# Patient Record
Sex: FEMALE | Race: WHITE | NOT HISPANIC OR LATINO | Employment: UNEMPLOYED | URBAN - NONMETROPOLITAN AREA
[De-identification: names, ages, dates, MRNs, and addresses within clinical notes are randomized per-mention and may not be internally consistent; named-entity substitution may affect disease eponyms.]

---

## 2017-01-23 ENCOUNTER — OFFICE VISIT (OUTPATIENT)
Dept: SLEEP MEDICINE | Facility: HOSPITAL | Age: 42
End: 2017-01-23
Attending: INTERNAL MEDICINE

## 2017-01-23 ENCOUNTER — OUTSIDE FACILITY SERVICE (OUTPATIENT)
Dept: PULMONOLOGY | Facility: CLINIC | Age: 42
End: 2017-01-23

## 2017-01-23 DIAGNOSIS — G47.19 EXCESSIVE DAYTIME SLEEPINESS: ICD-10-CM

## 2017-01-23 DIAGNOSIS — R06.83 SNORING: ICD-10-CM

## 2017-01-23 PROCEDURE — S0260 H&P FOR SURGERY: HCPCS | Performed by: INTERNAL MEDICINE

## 2017-01-23 PROCEDURE — 95810 POLYSOM 6/> YRS 4/> PARAM: CPT

## 2017-02-21 RX ORDER — LEVOTHYROXINE SODIUM 0.2 MG/1
TABLET ORAL
Qty: 30 TABLET | Refills: 5 | Status: SHIPPED | OUTPATIENT
Start: 2017-02-21 | End: 2017-10-05 | Stop reason: SDUPTHER

## 2017-02-24 ENCOUNTER — OFFICE VISIT (OUTPATIENT)
Dept: INTERNAL MEDICINE | Facility: CLINIC | Age: 42
End: 2017-02-24

## 2017-02-24 VITALS
DIASTOLIC BLOOD PRESSURE: 80 MMHG | HEIGHT: 64 IN | HEART RATE: 71 BPM | BODY MASS INDEX: 40.63 KG/M2 | OXYGEN SATURATION: 98 % | WEIGHT: 238 LBS | SYSTOLIC BLOOD PRESSURE: 130 MMHG

## 2017-02-24 DIAGNOSIS — R00.0 TACHYCARDIA: ICD-10-CM

## 2017-02-24 DIAGNOSIS — E07.9 THYROID DISORDER: ICD-10-CM

## 2017-02-24 DIAGNOSIS — E55.9 VITAMIN D DEFICIENCY: Primary | ICD-10-CM

## 2017-02-24 DIAGNOSIS — Z79.899 ENCOUNTER FOR LONG-TERM CURRENT USE OF MEDICATION: ICD-10-CM

## 2017-02-24 LAB
25(OH)D3 SERPL-MCNC: 13.9 NG/ML
T4 FREE SERPL-MCNC: 1.18 NG/DL (ref 0.89–1.76)
TSH SERPL DL<=0.05 MIU/L-ACNC: 0.02 MIU/ML (ref 0.35–5.35)

## 2017-02-24 PROCEDURE — 99214 OFFICE O/P EST MOD 30 MIN: CPT | Performed by: FAMILY MEDICINE

## 2017-02-24 PROCEDURE — 82306 VITAMIN D 25 HYDROXY: CPT | Performed by: FAMILY MEDICINE

## 2017-02-24 PROCEDURE — 84439 ASSAY OF FREE THYROXINE: CPT | Performed by: FAMILY MEDICINE

## 2017-02-24 PROCEDURE — 84443 ASSAY THYROID STIM HORMONE: CPT | Performed by: FAMILY MEDICINE

## 2017-02-24 PROCEDURE — 36415 COLL VENOUS BLD VENIPUNCTURE: CPT | Performed by: FAMILY MEDICINE

## 2017-02-24 RX ORDER — CARVEDILOL PHOSPHATE 20 MG/1
20 CAPSULE, EXTENDED RELEASE ORAL DAILY
Qty: 30 CAPSULE | Refills: 11 | Status: SHIPPED | OUTPATIENT
Start: 2017-02-24 | End: 2017-03-31 | Stop reason: ALTCHOICE

## 2017-02-24 NOTE — PROGRESS NOTES
Subjective   Debi Munoz is a 41 y.o. female.     History of Present Illness   Debi presents today for fu on vitamin D def, as well as hypothyroidism. She's needing bi-annual labs for monitoring.  VSS. NAD.  She's stable on the cymbalta for her depression and seems well with it.  She states that the bystolic has a 50 dollar copay, and that's a lot for them. She is taking it however. We can try coreg and see how that does.      The following portions of the patient's history were reviewed and updated as appropriate: allergies, current medications, past social history and problem list.    Review of Systems   Constitutional: Negative for appetite change, chills, fatigue, fever and unexpected weight change.   HENT: Negative for congestion, ear pain, hearing loss, nosebleeds, postnasal drip, rhinorrhea, sore throat, tinnitus and trouble swallowing.    Eyes: Negative for photophobia, discharge and visual disturbance.   Respiratory: Negative for cough, chest tightness, shortness of breath and wheezing.    Cardiovascular: Negative for chest pain, palpitations and leg swelling.   Gastrointestinal: Negative for abdominal distention, abdominal pain, blood in stool, constipation, diarrhea, nausea and vomiting.   Endocrine: Negative for cold intolerance, heat intolerance, polydipsia, polyphagia and polyuria.   Musculoskeletal: Negative for arthralgias, back pain, joint swelling, myalgias, neck pain and neck stiffness.   Skin: Negative for color change, pallor, rash and wound.   Allergic/Immunologic: Negative for environmental allergies, food allergies and immunocompromised state.   Neurological: Negative for dizziness, tremors, seizures, weakness, numbness and headaches.   Hematological: Negative for adenopathy. Does not bruise/bleed easily.   Psychiatric/Behavioral: Negative for agitation, behavioral problems, confusion, hallucinations, self-injury and suicidal ideas. The patient is not nervous/anxious.        Objective  "  Visit Vitals   • /80   • Pulse 71   • Ht 64\" (162.6 cm)   • Wt 238 lb (108 kg)   • SpO2 98%   • BMI 40.85 kg/m2     Physical Exam   Constitutional: She is oriented to person, place, and time. She appears well-developed and well-nourished. No distress.   HENT:   Head: Normocephalic and atraumatic.   Right Ear: External ear normal.   Left Ear: External ear normal.   Nose: Nose normal.   Mouth/Throat: Oropharynx is clear and moist.   Eyes: Conjunctivae and EOM are normal. Pupils are equal, round, and reactive to light. Right eye exhibits no discharge. Left eye exhibits no discharge. No scleral icterus.   Neck: Normal range of motion. Neck supple. No JVD present. No tracheal deviation present. No thyromegaly present.   Cardiovascular: Normal rate, regular rhythm, normal heart sounds and intact distal pulses.  Exam reveals no gallop and no friction rub.    No murmur heard.  Pulmonary/Chest: Effort normal and breath sounds normal. No stridor. No respiratory distress. She has no wheezes. She has no rales. She exhibits no tenderness.   Abdominal: Soft. Bowel sounds are normal. She exhibits no distension and no mass. There is no tenderness. There is no rebound and no guarding. No hernia.   Musculoskeletal: Normal range of motion. She exhibits no edema, tenderness or deformity.   Lymphadenopathy:     She has no cervical adenopathy.   Neurological: She is alert and oriented to person, place, and time. She has normal reflexes. She displays normal reflexes. No cranial nerve deficit. She exhibits normal muscle tone.   Skin: Skin is warm and dry. No rash noted. No erythema. No pallor.   Psychiatric: She has a normal mood and affect. Her behavior is normal. Judgment normal.       Assessment/Plan   Problem List Items Addressed This Visit        Cardiovascular and Mediastinum    Tachycardia    Relevant Medications    carvedilol CR (COREG CR) 20 MG 24 hr capsule       Digestive    Vitamin D deficiency - Primary    Relevant " Orders    T4, Free    TSH    Vitamin D 25 Hydroxy       Endocrine    Thyroid disorder    Relevant Medications    carvedilol CR (COREG CR) 20 MG 24 hr capsule    Other Relevant Orders    T4, Free    TSH    Vitamin D 25 Hydroxy       Other    Encounter for long-term current use of medication    Relevant Orders    T4, Free    TSH    Vitamin D 25 Hydroxy        Get labs, try coreg vs bystolic, and fu in a month. Sooner if needed.

## 2017-03-29 ENCOUNTER — TELEPHONE (OUTPATIENT)
Dept: PULMONOLOGY | Facility: CLINIC | Age: 42
End: 2017-03-29

## 2017-03-29 DIAGNOSIS — G47.33 OSA (OBSTRUCTIVE SLEEP APNEA): Primary | ICD-10-CM

## 2017-03-29 NOTE — TELEPHONE ENCOUNTER
Patient, spouse called states patient has not been called with report from sleep study done 01/2017. WINNIE Castillo was given report she will send report to [facility].

## 2017-03-31 ENCOUNTER — OFFICE VISIT (OUTPATIENT)
Dept: INTERNAL MEDICINE | Facility: CLINIC | Age: 42
End: 2017-03-31

## 2017-03-31 VITALS
WEIGHT: 241 LBS | OXYGEN SATURATION: 94 % | BODY MASS INDEX: 41.15 KG/M2 | HEART RATE: 74 BPM | SYSTOLIC BLOOD PRESSURE: 120 MMHG | HEIGHT: 64 IN | DIASTOLIC BLOOD PRESSURE: 80 MMHG

## 2017-03-31 DIAGNOSIS — R00.0 TACHYCARDIA: Primary | ICD-10-CM

## 2017-03-31 DIAGNOSIS — E07.9 THYROID DISORDER: ICD-10-CM

## 2017-03-31 DIAGNOSIS — F41.9 ANXIETY AND DEPRESSION: ICD-10-CM

## 2017-03-31 DIAGNOSIS — E55.9 VITAMIN D DEFICIENCY: ICD-10-CM

## 2017-03-31 DIAGNOSIS — F32.A ANXIETY AND DEPRESSION: ICD-10-CM

## 2017-03-31 DIAGNOSIS — Z79.899 ENCOUNTER FOR LONG-TERM CURRENT USE OF MEDICATION: ICD-10-CM

## 2017-03-31 PROCEDURE — 99214 OFFICE O/P EST MOD 30 MIN: CPT | Performed by: FAMILY MEDICINE

## 2017-03-31 RX ORDER — ERGOCALCIFEROL 1.25 MG/1
50000 CAPSULE ORAL WEEKLY
Qty: 12 CAPSULE | Refills: 3 | Status: SHIPPED | OUTPATIENT
Start: 2017-03-31 | End: 2020-10-12

## 2017-03-31 RX ORDER — NEBIVOLOL HYDROCHLORIDE 10 MG/1
TABLET ORAL
Refills: 11 | COMMUNITY
Start: 2017-03-23 | End: 2017-12-21 | Stop reason: SDUPTHER

## 2017-03-31 NOTE — PROGRESS NOTES
"Subjective   Debikelby Munoz is a 41 y.o. female.     History of Present Illness   Debi had vitamin D that was low at 17, down from 23 prefiously.  She is fine with restarting the vitamin D supplementations.  She states that the bystolic and the coreg XR were both 50 dollars copay, so she's taking the bystolic.  She rarely takes the lorazepam.  She is otherwise doing well.      The following portions of the patient's history were reviewed and updated as appropriate: allergies, current medications, past social history and problem list.    Review of Systems   Constitutional: Negative for appetite change, chills, fatigue, fever and unexpected weight change.   HENT: Negative for congestion, ear pain, hearing loss, nosebleeds, postnasal drip, rhinorrhea, sore throat, tinnitus and trouble swallowing.    Eyes: Negative for photophobia, discharge and visual disturbance.   Respiratory: Negative for cough, chest tightness, shortness of breath and wheezing.    Cardiovascular: Negative for chest pain, palpitations and leg swelling.   Gastrointestinal: Negative for abdominal distention, abdominal pain, blood in stool, constipation, diarrhea, nausea and vomiting.   Endocrine: Negative for cold intolerance, heat intolerance, polydipsia, polyphagia and polyuria.   Musculoskeletal: Negative for arthralgias, back pain, joint swelling, myalgias, neck pain and neck stiffness.   Skin: Negative for color change, pallor, rash and wound.   Allergic/Immunologic: Negative for environmental allergies, food allergies and immunocompromised state.   Neurological: Negative for dizziness, tremors, seizures, weakness, numbness and headaches.   Hematological: Negative for adenopathy. Does not bruise/bleed easily.   Psychiatric/Behavioral: Negative for agitation, behavioral problems, confusion, hallucinations, self-injury and suicidal ideas. The patient is not nervous/anxious.        Objective   /80  Pulse 74  Ht 64\" (162.6 cm)  Wt 241 lb " (109 kg)  SpO2 94%  BMI 41.37 kg/m2  Physical Exam   Constitutional: She is oriented to person, place, and time. She appears well-developed and well-nourished. No distress.   HENT:   Head: Normocephalic and atraumatic.   Right Ear: External ear normal.   Left Ear: External ear normal.   Nose: Nose normal.   Mouth/Throat: Oropharynx is clear and moist.   Eyes: Conjunctivae and EOM are normal. Pupils are equal, round, and reactive to light. Right eye exhibits no discharge. Left eye exhibits no discharge. No scleral icterus.   Neck: Normal range of motion. Neck supple. No JVD present. No tracheal deviation present. No thyromegaly present.   Cardiovascular: Normal rate, regular rhythm, normal heart sounds and intact distal pulses.  Exam reveals no gallop and no friction rub.    No murmur heard.  Pulmonary/Chest: Effort normal and breath sounds normal. No stridor. No respiratory distress. She has no wheezes. She has no rales. She exhibits no tenderness.   Abdominal: Soft. Bowel sounds are normal. She exhibits no distension and no mass. There is no tenderness. There is no rebound and no guarding. No hernia.   Musculoskeletal: Normal range of motion. She exhibits no edema, tenderness or deformity.   Lymphadenopathy:     She has no cervical adenopathy.   Neurological: She is alert and oriented to person, place, and time. She has normal reflexes. She displays normal reflexes. No cranial nerve deficit. She exhibits normal muscle tone.   Skin: Skin is warm and dry. No rash noted. No erythema. No pallor.   Psychiatric: She has a normal mood and affect. Her behavior is normal. Judgment normal.       Assessment/Plan   Problem List Items Addressed This Visit        Cardiovascular and Mediastinum    Tachycardia - Primary       Digestive    Vitamin D deficiency    Relevant Medications    vitamin D (ERGOCALCIFEROL) 51169 UNITS capsule capsule       Endocrine    Thyroid disorder    Relevant Medications    BYSTOLIC 10 MG tablet        Other    Anxiety and depression    Encounter for long-term current use of medication        Fu in august. Sooner if needed.

## 2017-06-19 ENCOUNTER — OFFICE VISIT (OUTPATIENT)
Dept: PULMONOLOGY | Facility: CLINIC | Age: 42
End: 2017-06-19

## 2017-06-19 VITALS
BODY MASS INDEX: 40.46 KG/M2 | WEIGHT: 237 LBS | DIASTOLIC BLOOD PRESSURE: 82 MMHG | OXYGEN SATURATION: 97 % | HEIGHT: 64 IN | HEART RATE: 82 BPM | RESPIRATION RATE: 16 BRPM | SYSTOLIC BLOOD PRESSURE: 110 MMHG

## 2017-06-19 DIAGNOSIS — E66.9 OBESITY (BMI 30-39.9): ICD-10-CM

## 2017-06-19 DIAGNOSIS — G47.00 INSOMNIA, UNSPECIFIED TYPE: ICD-10-CM

## 2017-06-19 DIAGNOSIS — G47.33 OSA (OBSTRUCTIVE SLEEP APNEA): Primary | ICD-10-CM

## 2017-06-19 PROCEDURE — 99214 OFFICE O/P EST MOD 30 MIN: CPT | Performed by: INTERNAL MEDICINE

## 2017-06-19 RX ORDER — TEMAZEPAM 15 MG/1
15 CAPSULE ORAL NIGHTLY PRN
Refills: 0
Start: 2017-06-19 | End: 2017-08-31

## 2017-06-19 NOTE — PROGRESS NOTES
"Chief Complaint   Patient presents with   • Follow-up   • Sleep Apnea         Subjective   Debi Munoz is a 42 y.o. female.     History of Present Illness   Patient comes back today to discuss the results of Sleep study.     I discussed the results of sleep study with the patient, in detail. I have told the patient that the apnea hypopnea index was 23.6 / hr.     Patient does report some improvement in daytime sleepiness, although she continues to struggle with her inability to maintain sleep throughout the night.  She says that she wakes up every 2 hours and usually sits in the bed for another 45 minutes or so before falling back asleep.      She says that she wakes up in the morning and usually takes a nap which lasts 2 hours, between 10 AM to 12 noon.      She denies any significant issues with the mask and actually seems to like her nasal mask.  She says that apart from minimal improvement in daytime sleepiness, she has noticed somewhat increased energy levels in the morning for the first 2 hours or so after she uses a CPAP device.  She does insist that most of her issues surrounding the fact that she cannot stay asleep for more than 2 hours at a time.    She usually goes to bed around 12:30 AM or 1 AM in the morning and wakes up in the morning at 7 AM.  She also endorses 2 or 3 naps per day, lasting 1-2 hours each.      The following portions of the patient's history were reviewed and updated as appropriate: allergies, current medications, past family history, past medical history, past social history and past surgical history.    Review of Systems   HENT: Negative for congestion, sinus pressure, sneezing and sore throat.    Respiratory: Positive for shortness of breath. Negative for cough and chest tightness.    Cardiovascular: Positive for leg swelling. Negative for chest pain.       Objective   Visit Vitals   • /82   • Pulse 82   • Resp 16   • Ht 64\" (162.6 cm)   • Wt 237 lb (108 kg)   • SpO2 97% "   • BMI 40.68 kg/m2       Physical Exam   Constitutional: She is oriented to person, place, and time. She appears well-developed and well-nourished.   HENT:   Head: Atraumatic.   Crowded oropharynx.    Cardiovascular: Normal rate and regular rhythm.    Pulmonary/Chest: Effort normal and breath sounds normal. She has no wheezes. She has no rales.   Musculoskeletal:   Gait was normal.   Neurological: She is alert and oriented to person, place, and time.   Psychiatric: She has a normal mood and affect.   Vitals reviewed.      Assessment/Plan   Debi was seen today for follow-up and sleep apnea.    Diagnoses and all orders for this visit:    ERNA (obstructive sleep apnea)    Insomnia, unspecified type    Obesity (BMI 30-39.9)    Other orders  -     temazepam (RESTORIL) 15 MG capsule; Take 1 capsule by mouth At Night As Needed for Sleep.         Return in about 4 months (around 10/19/2017) for Recheck, For Mackenzie.    DISCUSSION (if any):  Had a very long discussion with the patient with regards to sleep hygiene.  I spent 25-30 minutes detailing the multiple recommendations including sleep restriction and avoidance of naps during the day.  I also encouraged her to follow strict times to go to bed and wake up in the morning.    It is quite clear that the patient has responded to a degree, to her CPAP and currently her most bothersome issue seems to be insomnia.  She seems to have sleep maintenance insomnia.  I will start the patient on Restoril 15 mg.    She was advised to continue her current CPAP at a pressure of 8, with a nasal mask.    I reviewed sleep study in great detail with her and also shared the compliance data with her.  I told her that her AHI on the compliance it is only 4.4, although it was 23.6 on sleep study.    Arturo was reviewed.     Errors in dictation may reflect use of voice recognition software and not all errors in transcription may have been detected prior to signing    This document was  electronically signed by Afshan Smith MD June 19, 2017  12:24 PM

## 2017-08-31 ENCOUNTER — OFFICE VISIT (OUTPATIENT)
Dept: INTERNAL MEDICINE | Facility: CLINIC | Age: 42
End: 2017-08-31

## 2017-08-31 VITALS
BODY MASS INDEX: 41.48 KG/M2 | WEIGHT: 243 LBS | SYSTOLIC BLOOD PRESSURE: 130 MMHG | DIASTOLIC BLOOD PRESSURE: 80 MMHG | HEART RATE: 99 BPM | HEIGHT: 64 IN | OXYGEN SATURATION: 95 %

## 2017-08-31 DIAGNOSIS — Z00.00 HEALTH CARE MAINTENANCE: ICD-10-CM

## 2017-08-31 DIAGNOSIS — E55.9 VITAMIN D DEFICIENCY: ICD-10-CM

## 2017-08-31 DIAGNOSIS — E07.9 THYROID DISORDER: Primary | ICD-10-CM

## 2017-08-31 DIAGNOSIS — R20.0 NUMBNESS AND TINGLING IN RIGHT HAND: ICD-10-CM

## 2017-08-31 DIAGNOSIS — R20.2 NUMBNESS AND TINGLING IN RIGHT HAND: ICD-10-CM

## 2017-08-31 PROCEDURE — 99214 OFFICE O/P EST MOD 30 MIN: CPT | Performed by: FAMILY MEDICINE

## 2017-09-08 LAB
25(OH)D3+25(OH)D2 SERPL-MCNC: 45.1 NG/ML
BUN SERPL-MCNC: 13 MG/DL (ref 7–20)
BUN/CREAT SERPL: 18.6 (ref 7.1–23.5)
CALCIUM SERPL-MCNC: 9.3 MG/DL (ref 8.4–10.2)
CHLORIDE SERPL-SCNC: 109 MMOL/L (ref 98–107)
CHOLEST SERPL-MCNC: 168 MG/DL (ref 0–199)
CO2 SERPL-SCNC: 24 MMOL/L (ref 26–30)
CREAT SERPL-MCNC: 0.7 MG/DL (ref 0.6–1.3)
GLUCOSE SERPL-MCNC: 130 MG/DL (ref 74–98)
HDLC SERPL-MCNC: 43 MG/DL (ref 40–60)
LDLC SERPL CALC-MCNC: 86 MG/DL (ref 0–99)
POTASSIUM SERPL-SCNC: 4.5 MMOL/L (ref 3.5–5.1)
SODIUM SERPL-SCNC: 141 MMOL/L (ref 137–145)
T4 FREE SERPL-MCNC: 1.21 NG/DL (ref 0.78–2.19)
TRIGL SERPL-MCNC: 193 MG/DL
TSH SERPL DL<=0.005 MIU/L-ACNC: <0.015 MIU/ML (ref 0.47–4.68)
VLDLC SERPL CALC-MCNC: 38.6 MG/DL

## 2017-10-05 ENCOUNTER — OFFICE VISIT (OUTPATIENT)
Dept: INTERNAL MEDICINE | Facility: CLINIC | Age: 42
End: 2017-10-05

## 2017-10-05 VITALS
HEIGHT: 64 IN | DIASTOLIC BLOOD PRESSURE: 80 MMHG | HEART RATE: 68 BPM | WEIGHT: 241 LBS | SYSTOLIC BLOOD PRESSURE: 120 MMHG | BODY MASS INDEX: 41.15 KG/M2 | OXYGEN SATURATION: 94 %

## 2017-10-05 DIAGNOSIS — E07.9 THYROID DISORDER: ICD-10-CM

## 2017-10-05 DIAGNOSIS — Z23 NEED FOR VACCINATION: Primary | ICD-10-CM

## 2017-10-05 DIAGNOSIS — R73.03 PREDIABETES: ICD-10-CM

## 2017-10-05 DIAGNOSIS — S39.012A LUMBAR STRAIN, INITIAL ENCOUNTER: ICD-10-CM

## 2017-10-05 DIAGNOSIS — E55.9 VITAMIN D DEFICIENCY: ICD-10-CM

## 2017-10-05 LAB — HBA1C MFR BLD: 5.4 %

## 2017-10-05 PROCEDURE — 83036 HEMOGLOBIN GLYCOSYLATED A1C: CPT | Performed by: FAMILY MEDICINE

## 2017-10-05 PROCEDURE — 90686 IIV4 VACC NO PRSV 0.5 ML IM: CPT | Performed by: FAMILY MEDICINE

## 2017-10-05 PROCEDURE — 99213 OFFICE O/P EST LOW 20 MIN: CPT | Performed by: FAMILY MEDICINE

## 2017-10-05 PROCEDURE — 90471 IMMUNIZATION ADMIN: CPT | Performed by: FAMILY MEDICINE

## 2017-10-05 RX ORDER — IBUPROFEN 800 MG/1
800 TABLET ORAL EVERY 8 HOURS PRN
Qty: 90 TABLET | Refills: 11 | Status: SHIPPED | OUTPATIENT
Start: 2017-10-05 | End: 2020-12-22 | Stop reason: SDUPTHER

## 2017-10-05 RX ORDER — LEVOTHYROXINE SODIUM 0.2 MG/1
200 TABLET ORAL DAILY
Qty: 30 TABLET | Refills: 11 | Status: SHIPPED | OUTPATIENT
Start: 2017-10-05 | End: 2021-04-14

## 2017-10-05 NOTE — PROGRESS NOTES
"Subjective   Debi Munoz is a 42 y.o. female.     History of Present Illness   Debi has had elevated trig'ycerides for the past 2 years, and she had a fasting glucose of 130 not too long ago.  She has a daughter with type 1 diabetes.  She is stable on her thyroid levels.  VSS. NAD.  Her A1C today was 5.4.    The following portions of the patient's history were reviewed and updated as appropriate: allergies, current medications, past social history and problem list.    Review of Systems   Constitutional: Negative for appetite change, chills, fatigue, fever and unexpected weight change.   HENT: Negative for congestion, ear pain, hearing loss, nosebleeds, postnasal drip, rhinorrhea, sore throat, tinnitus and trouble swallowing.    Eyes: Negative for photophobia, discharge and visual disturbance.   Respiratory: Negative for cough, chest tightness, shortness of breath and wheezing.    Cardiovascular: Negative for chest pain, palpitations and leg swelling.   Gastrointestinal: Negative for abdominal distention, abdominal pain, blood in stool, constipation, diarrhea, nausea and vomiting.   Endocrine: Negative for cold intolerance, heat intolerance, polydipsia, polyphagia and polyuria.   Musculoskeletal: Negative for arthralgias, back pain, joint swelling, myalgias, neck pain and neck stiffness.   Skin: Negative for color change, pallor, rash and wound.   Allergic/Immunologic: Negative for environmental allergies, food allergies and immunocompromised state.   Neurological: Negative for dizziness, tremors, seizures, weakness, numbness and headaches.   Hematological: Negative for adenopathy. Does not bruise/bleed easily.   Psychiatric/Behavioral: Negative for agitation, behavioral problems, confusion, hallucinations, self-injury and suicidal ideas. The patient is not nervous/anxious.        Objective   /80  Pulse 68  Ht 64\" (162.6 cm)  Wt 241 lb (109 kg)  SpO2 94%  BMI 41.37 kg/m2  Physical Exam "   Constitutional: She is oriented to person, place, and time. She appears well-developed and well-nourished. No distress.   HENT:   Head: Normocephalic and atraumatic.   Right Ear: External ear normal.   Left Ear: External ear normal.   Nose: Nose normal.   Mouth/Throat: Oropharynx is clear and moist.   Eyes: Conjunctivae and EOM are normal. Pupils are equal, round, and reactive to light. Right eye exhibits no discharge. Left eye exhibits no discharge. No scleral icterus.   Neck: Normal range of motion. Neck supple. No JVD present. No tracheal deviation present. No thyromegaly present.   Cardiovascular: Normal rate, regular rhythm, normal heart sounds and intact distal pulses.  Exam reveals no gallop and no friction rub.    No murmur heard.  Pulmonary/Chest: Effort normal and breath sounds normal. No stridor. No respiratory distress. She has no wheezes. She has no rales. She exhibits no tenderness.   Abdominal: Soft. Bowel sounds are normal. She exhibits no distension and no mass. There is no tenderness. There is no rebound and no guarding. No hernia.   Musculoskeletal: Normal range of motion. She exhibits no edema, tenderness or deformity.   Lymphadenopathy:     She has no cervical adenopathy.   Neurological: She is alert and oriented to person, place, and time. She has normal reflexes. She displays normal reflexes. No cranial nerve deficit. She exhibits normal muscle tone.   Skin: Skin is warm and dry. No rash noted. No erythema. No pallor.   Psychiatric: She has a normal mood and affect. Her behavior is normal. Judgment normal.       Assessment/Plan   Problem List Items Addressed This Visit        Digestive    Vitamin D deficiency  No changes       Endocrine    Thyroid disorder  No changes       Other    Prediabetes    Relevant Orders    POC Glycosylated Hemoglobin (Hb A1C)    Need for vaccination - Primary    Relevant Orders    Flu Vaccine Quad PF 3YR+ (Completed)           FU in 6 months, sooner if needed.

## 2017-10-18 ENCOUNTER — PROCEDURE VISIT (OUTPATIENT)
Dept: ORTHOPEDIC SURGERY | Facility: CLINIC | Age: 42
End: 2017-10-18

## 2017-10-18 DIAGNOSIS — R20.2 NUMBNESS AND TINGLING IN RIGHT HAND: ICD-10-CM

## 2017-10-18 DIAGNOSIS — R20.0 NUMBNESS AND TINGLING IN RIGHT HAND: ICD-10-CM

## 2017-10-18 PROCEDURE — 95909 NRV CNDJ TST 5-6 STUDIES: CPT | Performed by: PHYSICAL THERAPIST

## 2017-10-18 PROCEDURE — 95886 MUSC TEST DONE W/N TEST COMP: CPT | Performed by: PHYSICAL THERAPIST

## 2017-10-19 ENCOUNTER — OFFICE VISIT (OUTPATIENT)
Dept: PULMONOLOGY | Facility: CLINIC | Age: 42
End: 2017-10-19

## 2017-10-19 VITALS
HEART RATE: 79 BPM | OXYGEN SATURATION: 98 % | RESPIRATION RATE: 17 BRPM | DIASTOLIC BLOOD PRESSURE: 90 MMHG | SYSTOLIC BLOOD PRESSURE: 122 MMHG | BODY MASS INDEX: 41.32 KG/M2 | HEIGHT: 64 IN | WEIGHT: 242 LBS

## 2017-10-19 DIAGNOSIS — E66.9 OBESITY (BMI 30-39.9): ICD-10-CM

## 2017-10-19 DIAGNOSIS — G47.33 OSA (OBSTRUCTIVE SLEEP APNEA): Primary | ICD-10-CM

## 2017-10-19 DIAGNOSIS — G47.00 INSOMNIA, UNSPECIFIED TYPE: ICD-10-CM

## 2017-10-19 PROCEDURE — 99214 OFFICE O/P EST MOD 30 MIN: CPT | Performed by: INTERNAL MEDICINE

## 2017-10-19 RX ORDER — DOXEPIN HYDROCHLORIDE 6 MG/1
6 TABLET ORAL
Qty: 30 TABLET | Refills: 3 | Status: SHIPPED | OUTPATIENT
Start: 2017-10-19 | End: 2020-08-26

## 2017-10-19 NOTE — PROGRESS NOTES
"Chief Complaint   Patient presents with   • Follow-up     ERNA         Subjective   Debi Munoz is a 42 y.o. female.     History of Present Illness   Comes in today to follow-up on obstructive sleep apnea and insomnia.    The patient says that she was able to start using her CPAP device on a regular basis and although she does feel slightly better, especially early on in the morning, she continues to have daytime sleepiness and tiredness.    The patient continues to have \"odd hours of sleep\".  She sometimes does not go to bed until 1 AM and even on a regular night, she does not go to bed to 11:30 PM or so.    The patient is using nasal mask with CPAP pressure at 8.    She says that the pressure does not bother her too much in the beginning but occasionally she feels that it is not \"enough\".    She continues to have nights where she sleeps for 8 hours, but mostly that occurs only once or twice a week.    The following portions of the patient's history were reviewed and updated as appropriate: allergies, current medications, past family history, past medical history, past social history and past surgical history.    Review of Systems   Constitutional: Negative for chills and fever.   HENT: Positive for sore throat. Negative for sinus pressure and sneezing.    Respiratory: Negative for cough, chest tightness and shortness of breath.    Psychiatric/Behavioral: Positive for sleep disturbance.       Objective   Visit Vitals   • /90   • Pulse 79   • Resp 17   • Ht 64\" (162.6 cm)   • Wt 242 lb (110 kg)   • SpO2 98%   • BMI 41.54 kg/m2       Physical Exam   Constitutional: She is oriented to person, place, and time. She appears well-developed and well-nourished.   HENT:   Head: Atraumatic.   Crowded oropharynx.    Cardiovascular: Normal rate and regular rhythm.    Pulmonary/Chest: Effort normal and breath sounds normal. No respiratory distress. She has no wheezes. She has no rales.   Musculoskeletal:   Gait was " normal.   Neurological: She is alert and oriented to person, place, and time.   Psychiatric: She has a normal mood and affect.   Vitals reviewed.      Assessment/Plan   Debi was seen today for follow-up.    Diagnoses and all orders for this visit:    ERNA (obstructive sleep apnea)  -     BIPAP / CPAP Adjustment    Insomnia, unspecified type    Obesity (BMI 30-39.9)    Other orders  -     Doxepin HCl 6 MG tablet; Take 6 mg by mouth every night at bedtime.         Return in about 8 weeks (around 12/14/2017) for Recheck, For Mackenzie.    DISCUSSION (if any):  The patient continues to have insomnia which could be multifactorial.    She clearly needs to improve on her sleep hygiene and once again discussed multiple strategies to achieve the same.    Since Ambien caused episodes of sleep eating/sleepwalking, I will avoid similar agents.    Restoril was unable to provide any significant relief.    I have recommended her to start a trial of doxepin at 6 mg dose to see if that relieves some of her symptoms of insomnia.    As far as his sleep apnea is concerned, I believe she will benefit from an auto Pap as some of her issues could be secondary to not adequate pressure on the CPAP device.  I will empirically start her on a pressure of 6/12.      Dictated utilizing Dragon dictation.    This document was electronically signed by Afshan Smith MD October 19, 2017  11:04 AM

## 2017-10-31 ENCOUNTER — TELEPHONE (OUTPATIENT)
Dept: INTERNAL MEDICINE | Facility: CLINIC | Age: 42
End: 2017-10-31

## 2017-10-31 DIAGNOSIS — G56.00 CARPAL TUNNEL SYNDROME, UNSPECIFIED LATERALITY: Primary | ICD-10-CM

## 2017-11-28 DIAGNOSIS — G56.00 CARPAL TUNNEL SYNDROME, UNSPECIFIED LATERALITY: Primary | ICD-10-CM

## 2017-12-14 ENCOUNTER — OFFICE VISIT (OUTPATIENT)
Dept: PULMONOLOGY | Facility: CLINIC | Age: 42
End: 2017-12-14

## 2017-12-14 VITALS
WEIGHT: 245 LBS | DIASTOLIC BLOOD PRESSURE: 80 MMHG | BODY MASS INDEX: 41.83 KG/M2 | HEIGHT: 64 IN | SYSTOLIC BLOOD PRESSURE: 122 MMHG | OXYGEN SATURATION: 97 % | RESPIRATION RATE: 16 BRPM | HEART RATE: 77 BPM

## 2017-12-14 DIAGNOSIS — G47.00 INSOMNIA, UNSPECIFIED TYPE: ICD-10-CM

## 2017-12-14 DIAGNOSIS — R06.02 SOB (SHORTNESS OF BREATH): ICD-10-CM

## 2017-12-14 DIAGNOSIS — G47.33 OSA (OBSTRUCTIVE SLEEP APNEA): Primary | ICD-10-CM

## 2017-12-14 DIAGNOSIS — R00.0 TACHYCARDIA: ICD-10-CM

## 2017-12-14 PROCEDURE — 99214 OFFICE O/P EST MOD 30 MIN: CPT | Performed by: NURSE PRACTITIONER

## 2017-12-14 NOTE — PROGRESS NOTES
"Chief Complaint   Patient presents with   • Follow-up   • Sleep Apnea         Subjective   Debi Munoz is a 42 y.o. female.     History of Present Illness   The patient comes in today for follow-up of obstructive sleep apnea and insomnia.    She reports having multiple reasons she does not sleep much including having a daughter with type 1 diabetes mellitus who is had several issues recently, getting up to take the dogs out in the middle of the night, and a  who snores very loudly.  She states the doxepin may help her sleep some but she has not been taking it but she did take the doxepin last night and today she feels \"like I can't wake up\".    She is using AutoPap at settings of 6/12 however she states she falls asleep sometimes without putting the machine on.  She will also get up in the middle of the night and moved to the couch and she does not always take her machine with her.  She does report feeling like her sleep is a little more refreshed when she uses the AutoPap at night.    She also has quite a bit of pain in her wrists and she is supposed to be having carpal tunnel surgery tomorrow.    The following portions of the patient's history were reviewed and updated as appropriate: allergies, current medications, past family history, past medical history, past social history and past surgical history.    Review of Systems   HENT: Positive for sore throat. Negative for sinus pressure and sneezing.    Respiratory: Negative for cough, shortness of breath and wheezing.        Objective   Visit Vitals   • /80   • Pulse 77   • Resp 16   • Ht 162.6 cm (64.02\")   • Wt 111 kg (245 lb)   • SpO2 97%   • BMI 42.03 kg/m2     Physical Exam   Constitutional: She is oriented to person, place, and time. She appears well-developed.   HENT:   Head: Atraumatic.   Crowded oropharynx.    Eyes: EOM are normal.   Neck:   Increased adipose tissue.   Pulmonary/Chest: Effort normal and breath sounds normal. She has no " wheezes.   Musculoskeletal:   Gait was normal.   Neurological: She is alert and oriented to person, place, and time.   Psychiatric: She has a normal mood and affect.   Vitals reviewed.          Assessment/Plan   Debi was seen today for follow-up and sleep apnea.    Diagnoses and all orders for this visit:    ERNA (obstructive sleep apnea)    Insomnia, unspecified type           Return in about 3 months (around 3/14/2018) for Recheck, For Dr. Smith.    DISCUSSION (if any):  Since she feels like the doxepin helps some but can leave her feeling tired or with a hangover feeling the next morning I have asked her to cut the dose in half and try taking the doxepin at half dose regular every night for 2 weeks.  Hopefully by decreasing the dose she will not have that hangover feeling the next morning or difficulty waking but will help her sleep during the night.    We have also discussed sleep hygiene in great detail.  I have spent 25-30 minutes reviewing sleep hygiene.  I have asked her to try to minimize distractions at night such as getting up with the dogs.  I've also asked her that if she moves to the couch in the middle of the night for whatever reason to take her machine with her.    She is not compliant with AutoPap use however she will continue to work on decreasing nighttime distractions as well as putting the mask and turning the machine on prior to falling asleep.  When she uses the machine her average AHI is 2.8.  She has also been sick several days and was unable to use the machine over the last month.  Since she is supposed to have carpal tunnel surgery tomorrow I have asked her to move the machine to wherever it is she will be spending most of her time sleeping whether that is in her bedroom or on the couch/recliner.    I also reviewed some of her previous labs and her TSH level was low in September.  The TSH was less than 0.015 and free T4 1 0.21.  She is currently on 200 µg of levothyroxine and states this  medication dose was not changed at that time.  I have asked her to continue to follow with her PCP and to discuss this with him should her level continued to be low as this can cause her to feel excessively fatigued as well.      Dictated utilizing Dragon dictation.    This document was electronically signed by JERO Alberto December 27, 2017  3:38 PM

## 2017-12-18 RX ORDER — NEBIVOLOL HYDROCHLORIDE 10 MG/1
TABLET ORAL
Qty: 30 TABLET | Refills: 0 | OUTPATIENT
Start: 2017-12-18

## 2017-12-21 NOTE — TELEPHONE ENCOUNTER
Patients spouse called the office requesting to see if we can call her Rx in. The patients spouse stated that she is planning on following [facility] when he starts up in January.

## 2017-12-22 RX ORDER — NEBIVOLOL HYDROCHLORIDE 10 MG/1
10 TABLET ORAL DAILY
Qty: 30 TABLET | Refills: 0 | Status: SHIPPED | OUTPATIENT
Start: 2017-12-22 | End: 2023-02-01

## 2018-03-20 ENCOUNTER — OFFICE VISIT (OUTPATIENT)
Dept: PULMONOLOGY | Facility: CLINIC | Age: 43
End: 2018-03-20

## 2018-03-20 VITALS
SYSTOLIC BLOOD PRESSURE: 126 MMHG | WEIGHT: 246 LBS | HEIGHT: 64 IN | OXYGEN SATURATION: 98 % | RESPIRATION RATE: 16 BRPM | BODY MASS INDEX: 42 KG/M2 | DIASTOLIC BLOOD PRESSURE: 72 MMHG | HEART RATE: 96 BPM

## 2018-03-20 DIAGNOSIS — G47.33 OSA (OBSTRUCTIVE SLEEP APNEA): Primary | ICD-10-CM

## 2018-03-20 DIAGNOSIS — E66.01 MORBID OBESITY, UNSPECIFIED OBESITY TYPE (HCC): ICD-10-CM

## 2018-03-20 DIAGNOSIS — Z72.821 POOR SLEEP HYGIENE: ICD-10-CM

## 2018-03-20 DIAGNOSIS — G47.00 INSOMNIA, UNSPECIFIED TYPE: ICD-10-CM

## 2018-03-20 PROCEDURE — 99214 OFFICE O/P EST MOD 30 MIN: CPT | Performed by: INTERNAL MEDICINE

## 2018-03-20 NOTE — PROGRESS NOTES
"Chief Complaint   Patient presents with   • Follow-up   • Sleep Apnea         Subjective   Debi Munoz is a 42 y.o. female.     History of Present Illness   Patient comes in today to follow-up on obstructive sleep apnea and insomnia.    She continues to struggle with very poor sleep hygiene and mentions that she usually does not go to sleep until 1 AM in the morning.  She usually wakes up around 5 AM as she has to \"check on my daughter\".    The patient also says that her  has weird sleep times and whenever he moves or gets up, she ends up waking up herself and then has a hard time going back to sleep    The patient usually but does not use her AutoPap device at that time.    Lately the patient has been unable to use her AutoPap regularly due to multiple issues including pain, depression and other health conditions.    The following portions of the patient's history were reviewed and updated as appropriate: allergies, current medications, past family history, past medical history, past social history and past surgical history.    Review of Systems   HENT: Negative for sinus pressure, sneezing and sore throat.    Respiratory: Negative for cough, shortness of breath and wheezing.        Objective   Visit Vitals  /72   Pulse 96   Resp 16   Ht 162.6 cm (64.02\")   Wt 112 kg (246 lb)   SpO2 98%   BMI 42.20 kg/m²       Physical Exam   Constitutional: She is oriented to person, place, and time. She appears well-developed and well-nourished.   HENT:   Head: Atraumatic.   Crowded oropharynx.    Cardiovascular: Normal rate and regular rhythm.    Pulmonary/Chest: Effort normal and breath sounds normal.   Musculoskeletal:   Gait was normal.   Neurological: She is alert and oriented to person, place, and time.   Psychiatric: She has a normal mood and affect.   Vitals reviewed.        Assessment/Plan   Debi was seen today for follow-up and sleep apnea.    Diagnoses and all orders for this visit:    ERNA " (obstructive sleep apnea)    Insomnia, unspecified type    Morbid obesity, unspecified obesity type    Poor sleep hygiene           Return in about 4 months (around 7/20/2018) for Recheck, [facility].    DISCUSSION (if any):  I had a very long discussion lasting about 35 minutes with this patient, regarding her very poor sleep hygiene.    Her insomnia is multifactorial and I believe depression is playing a major role.    She has tried and failed to respond to doxepin and Restoril.  Ambien caused sleepwalking episodes.  Although she believes Lunesta may have helped her in the past, he thinks it was many years ago and is unclear.    The patient clearly had moderate obstructive sleep apnea based on the last polysomnogram and I once again reviewed the data with her.  I also told the patient that she had poor sleep efficiency.  During the sleep study and decreased REM of less than 5% or so.    I told the patient to try to obtain at least 7 hours of sleep time during 24 hours.  Although it may not be appropriate for long-term, I have suggested to her to try and sleep for 3-4 hours at a time and to have 2 separate sessions of sleep during the day.    She seems to struggle to maintain sleep for more than 3-4 hours and wakes up multiple times with her , her dog and also due to her daughter's health conditions.    The patient was advised to continue AutoPap with nasal mask.      Dictated utilizing Dragon dictation.    This document was electronically signed by Afshan Smith MD March 20, 2018  6:30 PM

## 2018-04-04 DIAGNOSIS — G47.33 OSA (OBSTRUCTIVE SLEEP APNEA): Primary | ICD-10-CM

## 2018-04-05 ENCOUNTER — TRANSCRIBE ORDERS (OUTPATIENT)
Dept: ADMINISTRATIVE | Facility: HOSPITAL | Age: 43
End: 2018-04-05

## 2018-04-05 DIAGNOSIS — Z12.39 SCREENING BREAST EXAMINATION: Primary | ICD-10-CM

## 2018-07-24 ENCOUNTER — OFFICE VISIT (OUTPATIENT)
Dept: PULMONOLOGY | Facility: CLINIC | Age: 43
End: 2018-07-24

## 2018-07-24 VITALS
SYSTOLIC BLOOD PRESSURE: 128 MMHG | HEIGHT: 64 IN | BODY MASS INDEX: 40.97 KG/M2 | OXYGEN SATURATION: 95 % | WEIGHT: 240 LBS | RESPIRATION RATE: 16 BRPM | HEART RATE: 73 BPM | DIASTOLIC BLOOD PRESSURE: 84 MMHG

## 2018-07-24 DIAGNOSIS — Z72.821 POOR SLEEP HYGIENE: ICD-10-CM

## 2018-07-24 DIAGNOSIS — G47.33 OSA (OBSTRUCTIVE SLEEP APNEA): Primary | ICD-10-CM

## 2018-07-24 PROCEDURE — 99213 OFFICE O/P EST LOW 20 MIN: CPT | Performed by: INTERNAL MEDICINE

## 2018-07-24 NOTE — PROGRESS NOTES
"Chief Complaint   Patient presents with   • Follow-up   • Sleeping Problem         Subjective   Debi Munoz is a 43 y.o. female.     History of Present Illness   Comes in today to follow-up on sleep apnea.    She continues to have issues with staying asleep at night mostly because of her daughter's health.    She is using AutoPap with a nasal mask.  She actually feels or energized in the morning when she is able to use the device for more than 4-5 hours.    The following portions of the patient's history were reviewed and updated as appropriate: allergies, current medications, past family history, past medical history, past social history and past surgical history.    Review of Systems   HENT: Negative for sinus pressure, sneezing and sore throat.    Respiratory: Negative for cough, shortness of breath and wheezing.        Objective   Visit Vitals  /84   Pulse 73   Resp 16   Ht 162.6 cm (64.02\")   Wt 109 kg (240 lb)   SpO2 95%   BMI 41.18 kg/m²       Physical Exam   Constitutional: She is oriented to person, place, and time. She appears well-developed and well-nourished.   HENT:   Head: Atraumatic.   Crowded oropharynx.    Musculoskeletal:   Gait was normal.   Neurological: She is alert and oriented to person, place, and time.   Psychiatric: She has a normal mood and affect.   Vitals reviewed.        Assessment/Plan   Debi was seen today for follow-up and sleeping problem.    Diagnoses and all orders for this visit:    ERNA (obstructive sleep apnea)    Poor sleep hygiene         Return in about 9 months (around 4/24/2019).    DISCUSSION (if any):  Since the patient has family issues which are difficult to address and also make it difficult for her to stay asleep throughout the night on most occasions, I once again stressed sleep hygiene measures.    I've asked her not to stay in bed for more than 6-7 hours every night and not to take any naps during the day.    I've advised her to continue AutoPap with a " nasal mask especially since she feels rested in the morning after using the device.      Dictated utilizing Dragon dictation.    This document was electronically signed by Afshan Smith MD July 24, 2018  10:35 AM

## 2019-01-18 ENCOUNTER — APPOINTMENT (OUTPATIENT)
Dept: GENERAL RADIOLOGY | Facility: HOSPITAL | Age: 44
End: 2019-01-18

## 2019-01-18 ENCOUNTER — APPOINTMENT (OUTPATIENT)
Dept: CT IMAGING | Facility: HOSPITAL | Age: 44
End: 2019-01-18

## 2019-01-18 ENCOUNTER — HOSPITAL ENCOUNTER (EMERGENCY)
Facility: HOSPITAL | Age: 44
Discharge: HOME | End: 2019-01-18
Attending: EMERGENCY MEDICINE | Admitting: EMERGENCY MEDICINE

## 2019-01-18 VITALS
WEIGHT: 238.8 LBS | RESPIRATION RATE: 18 BRPM | DIASTOLIC BLOOD PRESSURE: 87 MMHG | HEART RATE: 71 BPM | BODY MASS INDEX: 40.77 KG/M2 | HEIGHT: 64 IN | OXYGEN SATURATION: 99 % | SYSTOLIC BLOOD PRESSURE: 147 MMHG | TEMPERATURE: 98.3 F

## 2019-01-18 DIAGNOSIS — G51.0 BELL'S PALSY: Primary | ICD-10-CM

## 2019-01-18 LAB
ALBUMIN SERPL-MCNC: 4.7 G/DL (ref 3.5–5)
ALBUMIN/GLOB SERPL: 1.8 G/DL (ref 1–2)
ALP SERPL-CCNC: 68 U/L (ref 38–126)
ALT SERPL W P-5'-P-CCNC: 21 U/L (ref 13–69)
ANION GAP SERPL CALCULATED.3IONS-SCNC: 10.9 MMOL/L (ref 10–20)
AST SERPL-CCNC: 19 U/L (ref 15–46)
BASOPHILS # BLD AUTO: 0.08 10*3/MM3 (ref 0–0.2)
BASOPHILS NFR BLD AUTO: 0.7 % (ref 0–2.5)
BILIRUB SERPL-MCNC: 0.4 MG/DL (ref 0.2–1.3)
BUN BLD-MCNC: 15 MG/DL (ref 7–20)
BUN/CREAT SERPL: 18.8 (ref 7.1–23.5)
CALCIUM SPEC-SCNC: 9.6 MG/DL (ref 8.4–10.2)
CHLORIDE SERPL-SCNC: 103 MMOL/L (ref 98–107)
CO2 SERPL-SCNC: 28 MMOL/L (ref 26–30)
CREAT BLD-MCNC: 0.8 MG/DL (ref 0.6–1.3)
DEPRECATED RDW RBC AUTO: 39.2 FL (ref 37–54)
EOSINOPHIL # BLD AUTO: 0.45 10*3/MM3 (ref 0–0.7)
EOSINOPHIL NFR BLD AUTO: 4.1 % (ref 0–7)
ERYTHROCYTE [DISTWIDTH] IN BLOOD BY AUTOMATED COUNT: 12.8 % (ref 11.5–14.5)
GFR SERPL CREATININE-BSD FRML MDRD: 78 ML/MIN/1.73
GLOBULIN UR ELPH-MCNC: 2.6 GM/DL
GLUCOSE BLD-MCNC: 121 MG/DL (ref 74–98)
HCT VFR BLD AUTO: 38.7 % (ref 37–47)
HGB BLD-MCNC: 13.9 G/DL (ref 12–16)
HOLD SPECIMEN: NORMAL
HOLD SPECIMEN: NORMAL
IMM GRANULOCYTES # BLD AUTO: 0.07 10*3/MM3 (ref 0–0.06)
IMM GRANULOCYTES NFR BLD AUTO: 0.6 % (ref 0–0.6)
INR PPP: 1.09 (ref 0.9–1.1)
LYMPHOCYTES # BLD AUTO: 2.62 10*3/MM3 (ref 0.6–3.4)
LYMPHOCYTES NFR BLD AUTO: 23.6 % (ref 10–50)
MCH RBC QN AUTO: 30.6 PG (ref 27–31)
MCHC RBC AUTO-ENTMCNC: 35.9 G/DL (ref 30–37)
MCV RBC AUTO: 85.2 FL (ref 81–99)
MONOCYTES # BLD AUTO: 0.67 10*3/MM3 (ref 0–0.9)
MONOCYTES NFR BLD AUTO: 6 % (ref 0–12)
NEUTROPHILS # BLD AUTO: 7.21 10*3/MM3 (ref 2–6.9)
NEUTROPHILS NFR BLD AUTO: 65 % (ref 37–80)
NRBC BLD AUTO-RTO: 0 /100 WBC (ref 0–0)
PLATELET # BLD AUTO: 345 10*3/MM3 (ref 130–400)
PMV BLD AUTO: 10.4 FL (ref 6–12)
POTASSIUM BLD-SCNC: 3.9 MMOL/L (ref 3.5–5.1)
PROT SERPL-MCNC: 7.3 G/DL (ref 6.3–8.2)
PROTHROMBIN TIME: 12.2 SECONDS (ref 9.3–12.1)
RBC # BLD AUTO: 4.54 10*6/MM3 (ref 4.2–5.4)
SODIUM BLD-SCNC: 138 MMOL/L (ref 137–145)
WBC NRBC COR # BLD: 11.1 10*3/MM3 (ref 4.8–10.8)
WHOLE BLOOD HOLD SPECIMEN: NORMAL
WHOLE BLOOD HOLD SPECIMEN: NORMAL

## 2019-01-18 PROCEDURE — 80053 COMPREHEN METABOLIC PANEL: CPT | Performed by: EMERGENCY MEDICINE

## 2019-01-18 PROCEDURE — 85025 COMPLETE CBC W/AUTO DIFF WBC: CPT | Performed by: EMERGENCY MEDICINE

## 2019-01-18 PROCEDURE — 71045 X-RAY EXAM CHEST 1 VIEW: CPT

## 2019-01-18 PROCEDURE — 70450 CT HEAD/BRAIN W/O DYE: CPT

## 2019-01-18 PROCEDURE — 63710000001 PREDNISONE PER 5 MG: Performed by: EMERGENCY MEDICINE

## 2019-01-18 PROCEDURE — 99284 EMERGENCY DEPT VISIT MOD MDM: CPT

## 2019-01-18 PROCEDURE — 85610 PROTHROMBIN TIME: CPT | Performed by: EMERGENCY MEDICINE

## 2019-01-18 RX ORDER — VALACYCLOVIR HYDROCHLORIDE 1 G/1
1000 TABLET, FILM COATED ORAL 3 TIMES DAILY
Qty: 21 TABLET | Refills: 0 | Status: SHIPPED | OUTPATIENT
Start: 2019-01-18 | End: 2020-09-03

## 2019-01-18 RX ORDER — SODIUM CHLORIDE 0.9 % (FLUSH) 0.9 %
10 SYRINGE (ML) INJECTION AS NEEDED
Status: DISCONTINUED | OUTPATIENT
Start: 2019-01-18 | End: 2019-01-18 | Stop reason: HOSPADM

## 2019-01-18 RX ORDER — METHYLPREDNISOLONE 4 MG/1
TABLET ORAL
Qty: 21 EACH | Refills: 0 | Status: SHIPPED | OUTPATIENT
Start: 2019-01-18 | End: 2020-09-03

## 2019-01-18 RX ADMIN — PREDNISONE 60 MG: 50 TABLET ORAL at 21:49

## 2019-01-19 NOTE — ED PROVIDER NOTES
Subjective   History of Present Illness   43F with a history of migraines presenting with left sided facial numbness.  States that she has had some numbness on left side of her face and difficulty using the left side of her mouth since 6 PM yesterday.  Denies any other specific symptoms.  Symptoms have been constant since this started.    Review of Systems   Neurological: Positive for weakness and numbness.   All other systems reviewed and are negative.      Past Medical History:   Diagnosis Date   • Gallstones    • Infection of kidney    • Migraine headache        No Known Allergies    Past Surgical History:   Procedure Laterality Date   • CARPAL TUNNEL RELEASE Bilateral    •  SECTION     • CHOLECYSTECTOMY     • ELBOW ARTHROPLASTY         Family History   Problem Relation Age of Onset   • Arthritis Other    • Cancer Other    • Thyroid disease Other    • Diabetes Other    • Mental illness Other    • Heart attack Other    • Arthritis Mother    • Cancer Mother    • Thyroid disease Mother    • Diabetes Father    • Mental illness Father    • Mental illness Sister    • Heart attack Other    • Cancer Other    • Cancer Other    • Diabetes Daughter        Social History     Socioeconomic History   • Marital status:      Spouse name: Not on file   • Number of children: Not on file   • Years of education: Not on file   • Highest education level: Not on file   Tobacco Use   • Smoking status: Never Smoker   • Smokeless tobacco: Never Used   Substance and Sexual Activity   • Alcohol use: No   • Drug use: No           Objective   Physical Exam   Constitutional: She is oriented to person, place, and time. She appears well-nourished. No distress.   HENT:   Head: Normocephalic.   Mouth/Throat: Oropharynx is clear and moist. No oropharyngeal exudate.   Eyes: EOM are normal. Right eye exhibits no discharge. Left eye exhibits no discharge. No scleral icterus.   Neck: Neck supple. No tracheal deviation present.    Cardiovascular: Normal rate, regular rhythm, normal heart sounds and intact distal pulses. Exam reveals no gallop and no friction rub.   No murmur heard.  Pulmonary/Chest: Effort normal and breath sounds normal. No stridor. No respiratory distress. She has no wheezes. She has no rales. She exhibits no tenderness.   Abdominal: Soft. She exhibits no distension and no mass. There is no tenderness. There is no rebound and no guarding. No hernia.   Musculoskeletal: She exhibits no edema or deformity.   Neurological: She is alert and oriented to person, place, and time.   Strength and sensation intact to BUE / BLE.  Left eye feels weak compared to the right eye with attempt to open when she attempts to hold it closed.  Left eyebrow does not raise as much as the right eyebrow.  The tongue deviates left slightly.  Symmetric smile.   Skin: Skin is warm and dry. She is not diaphoretic. No erythema. No pallor.   Psychiatric: She has a normal mood and affect. Her behavior is normal.   Nursing note and vitals reviewed.      Procedures           ED Course                  MDM  43-year-old female here with left-sided facial numbness and some weakness of the left face as well.  This appears to include the forehead which makes this more likely Bell's palsy then stroke.  Will send labs, CT scan of the head, and reassess.    9:42 PM CT scan, chest x-ray, and labs are all reassuring.  I discussed the findings with patient and again offered potential transfer to [facility] for MRI.  The patient and  felt comfortable foregoing this more likely diagnosis of Bell's palsy.  They agreed to call and follow up with neurology on an outpatient basis.  We'll discharge home with a course of steroids and strict return to care precautions.    Final diagnoses:   Bell's palsy            Brad Pritchard MD  01/18/19 [id number]

## 2019-04-23 ENCOUNTER — OFFICE VISIT (OUTPATIENT)
Dept: PULMONOLOGY | Facility: CLINIC | Age: 44
End: 2019-04-23

## 2019-04-23 VITALS
DIASTOLIC BLOOD PRESSURE: 76 MMHG | HEIGHT: 64 IN | HEART RATE: 65 BPM | SYSTOLIC BLOOD PRESSURE: 124 MMHG | WEIGHT: 236 LBS | BODY MASS INDEX: 40.29 KG/M2 | OXYGEN SATURATION: 96 % | RESPIRATION RATE: 16 BRPM

## 2019-04-23 DIAGNOSIS — Z72.821 POOR SLEEP HYGIENE: ICD-10-CM

## 2019-04-23 DIAGNOSIS — E66.01 MORBID OBESITY, UNSPECIFIED OBESITY TYPE (HCC): ICD-10-CM

## 2019-04-23 DIAGNOSIS — G47.00 INSOMNIA, UNSPECIFIED TYPE: ICD-10-CM

## 2019-04-23 DIAGNOSIS — G47.33 OSA (OBSTRUCTIVE SLEEP APNEA): Primary | ICD-10-CM

## 2019-04-23 PROCEDURE — 99214 OFFICE O/P EST MOD 30 MIN: CPT | Performed by: INTERNAL MEDICINE

## 2019-04-23 RX ORDER — TEMAZEPAM 15 MG/1
15 CAPSULE ORAL NIGHTLY PRN
Qty: 30 CAPSULE | Refills: 2 | Status: SHIPPED | OUTPATIENT
Start: 2019-04-23 | End: 2019-06-20

## 2019-04-23 RX ORDER — HYDROCHLOROTHIAZIDE 25 MG/1
25 TABLET ORAL DAILY
Refills: 11 | COMMUNITY
Start: 2019-03-29 | End: 2020-09-03

## 2019-04-23 NOTE — PROGRESS NOTES
"Chief Complaint   Patient presents with   • Follow-up   • Sleeping Problem         Subjective   Debi Munoz is a 43 y.o. female.     History of Present Illness   Patient comes back today for follow up of Sleep apnea. Patient does report only minimal improvement but now feels that the PAP device, at the current pressure of 6/12, is not relieving most of the symptoms    Patient reports slight worsening in daytime sleepiness when compared to before. Patient is not aware of snoring through the device.     Patient says that the compliance with the use of the equipment is good.     She is struggling to keep the mask on her face but even when she is able to keep it on her face for 5-6 hours at night, she does not feel rested.    She is also struggling with depression and feels that she doesn't have the energy to use the mask.     She has issues with her daughter's glucose alarm going off at odd times. Then she struggles with her 's snoring and also her dog.     Even when these issues are not a factor, she can't stay asleep for more then 3-4 hours AND even then she reports no change with AutoPAP.       The following portions of the patient's history were reviewed and updated as appropriate: allergies, current medications, past family history, past medical history, past social history and past surgical history.    Review of Systems   HENT: Negative for sinus pressure, sneezing and sore throat.    Respiratory: Negative for cough, shortness of breath and wheezing.        Objective   Visit Vitals  /76   Pulse 65   Resp 16   Ht 162.6 cm (64.02\")   Wt 107 kg (236 lb)   SpO2 96%   BMI 40.49 kg/m²       Physical Exam   Constitutional: She is oriented to person, place, and time. She appears well-developed and well-nourished.   HENT:   Head: Atraumatic.   Crowded oropharynx.    Neck:   Increased adipose tissue.    Musculoskeletal:   Gait was normal.   Neurological: She is alert and oriented to person, place, and " time.   Psychiatric: She has a normal mood and affect.   Vitals reviewed.        Assessment/Plan   Debi was seen today for follow-up and sleeping problem.    Diagnoses and all orders for this visit:    ERNA (obstructive sleep apnea)  -     Polysomnography 4 or More Parameters With CPAP; Future    Poor sleep hygiene  -     Polysomnography 4 or More Parameters With CPAP; Future    Morbid obesity, unspecified obesity type (CMS/HCC)  -     Polysomnography 4 or More Parameters With CPAP; Future    Insomnia, unspecified type    Other orders  -     temazepam (RESTORIL) 15 MG capsule; Take 1 capsule by mouth At Night As Needed for Sleep.           Return in about 7 weeks (around 6/11/2019) for Sleep/Mackenzie.    DISCUSSION (if any):  I told the patient that her symptoms are consistent with poorly controlled sleep apnea    Since the patient is having significant issues, the only realistic option is for her to undergo a full night titration study    Patient was advised to continue using PAP for at least 4 hours every night    Patient was advised to call this office with any issues    Arturo was reviewed.    Will try Restoril 15 mg PO QHS.     Side effects were discussed. She was advised NOT to take Restoril and Ativan together and also to not drink 6-8 hours before taking this medication.       Dictated utilizing Dragon dictation.    This document was electronically signed by Afshan Smith MD on 04/23/19 at 10:52 AM

## 2019-05-15 ENCOUNTER — HOSPITAL ENCOUNTER (OUTPATIENT)
Dept: SLEEP MEDICINE | Facility: HOSPITAL | Age: 44
Setting detail: THERAPIES SERIES
End: 2019-05-15

## 2019-05-15 DIAGNOSIS — G47.33 OSA (OBSTRUCTIVE SLEEP APNEA): ICD-10-CM

## 2019-05-15 DIAGNOSIS — E66.01 MORBID OBESITY, UNSPECIFIED OBESITY TYPE (HCC): ICD-10-CM

## 2019-05-15 DIAGNOSIS — Z72.821 POOR SLEEP HYGIENE: ICD-10-CM

## 2019-05-15 PROCEDURE — 95811 POLYSOM 6/>YRS CPAP 4/> PARM: CPT | Performed by: INTERNAL MEDICINE

## 2019-05-15 PROCEDURE — 95811 POLYSOM 6/>YRS CPAP 4/> PARM: CPT

## 2019-05-21 DIAGNOSIS — G47.33 OSA (OBSTRUCTIVE SLEEP APNEA): Primary | ICD-10-CM

## 2019-06-20 ENCOUNTER — OFFICE VISIT (OUTPATIENT)
Dept: PULMONOLOGY | Facility: CLINIC | Age: 44
End: 2019-06-20

## 2019-06-20 VITALS
BODY MASS INDEX: 42.17 KG/M2 | DIASTOLIC BLOOD PRESSURE: 84 MMHG | WEIGHT: 247 LBS | HEIGHT: 64 IN | HEART RATE: 64 BPM | SYSTOLIC BLOOD PRESSURE: 122 MMHG | OXYGEN SATURATION: 98 % | RESPIRATION RATE: 18 BRPM

## 2019-06-20 DIAGNOSIS — E66.01 MORBID OBESITY, UNSPECIFIED OBESITY TYPE (HCC): ICD-10-CM

## 2019-06-20 DIAGNOSIS — G47.33 OSA (OBSTRUCTIVE SLEEP APNEA): Primary | ICD-10-CM

## 2019-06-20 DIAGNOSIS — G47.00 INSOMNIA, UNSPECIFIED TYPE: ICD-10-CM

## 2019-06-20 PROCEDURE — 99213 OFFICE O/P EST LOW 20 MIN: CPT | Performed by: NURSE PRACTITIONER

## 2019-06-20 RX ORDER — CYPROHEPTADINE HYDROCHLORIDE 4 MG/1
TABLET ORAL
Refills: 3 | COMMUNITY
Start: 2019-05-29 | End: 2020-09-03

## 2019-09-18 ENCOUNTER — OFFICE VISIT (OUTPATIENT)
Dept: PULMONOLOGY | Facility: CLINIC | Age: 44
End: 2019-09-18

## 2019-09-18 VITALS
DIASTOLIC BLOOD PRESSURE: 77 MMHG | WEIGHT: 248 LBS | RESPIRATION RATE: 18 BRPM | BODY MASS INDEX: 42.34 KG/M2 | OXYGEN SATURATION: 97 % | HEIGHT: 64 IN | SYSTOLIC BLOOD PRESSURE: 126 MMHG | HEART RATE: 72 BPM

## 2019-09-18 DIAGNOSIS — G47.33 OSA (OBSTRUCTIVE SLEEP APNEA): Primary | ICD-10-CM

## 2019-09-18 DIAGNOSIS — G47.10 HYPERSOMNIA WITH SLEEP APNEA: ICD-10-CM

## 2019-09-18 DIAGNOSIS — Z72.821 POOR SLEEP HYGIENE: ICD-10-CM

## 2019-09-18 DIAGNOSIS — G47.30 HYPERSOMNIA WITH SLEEP APNEA: ICD-10-CM

## 2019-09-18 DIAGNOSIS — E66.01 MORBID OBESITY, UNSPECIFIED OBESITY TYPE (HCC): ICD-10-CM

## 2019-09-18 PROCEDURE — 99214 OFFICE O/P EST MOD 30 MIN: CPT | Performed by: INTERNAL MEDICINE

## 2019-09-18 RX ORDER — DULOXETIN HYDROCHLORIDE 60 MG/1
CAPSULE, DELAYED RELEASE ORAL
Refills: 1 | COMMUNITY
Start: 2019-09-03 | End: 2020-09-25

## 2019-09-18 RX ORDER — MODAFINIL 100 MG/1
100 TABLET ORAL 2 TIMES DAILY PRN
Qty: 60 TABLET | Refills: 3 | Status: SHIPPED | OUTPATIENT
Start: 2019-09-18 | End: 2020-08-26

## 2019-09-18 NOTE — PROGRESS NOTES
"Chief Complaint   Patient presents with   • Follow-up   • Sleeping Problem       Subjective   Debi Muonz is a 44 y.o. female.     History of Present Illness   Patient comes in today to follow-up on sleep apnea.    The patient says that despite using CPAP device on a regular basis, he continues to be tired in the morning.  He says that she tries to nap but usually cannot find the time to do so.  Even if she naps, she feels tired and stays fatigued.    She also has tried to improve her sleep hygiene but continues to have difficulty falling asleep 10 PM every night and sometimes has to stay up to 2 AM, \"doing told at home\".  She says that even when she goes to bed early, she has difficulty initiating and maintaining sleep.    The following portions of the patient's history were reviewed and updated as appropriate: allergies, current medications, past family history, past medical history, past social history and past surgical history.    Review of Systems   HENT: Positive for sore throat. Negative for sinus pressure and sneezing.    Respiratory: Negative for cough, shortness of breath and wheezing.        Objective   Visit Vitals  /77   Pulse 72   Resp 18   Ht 162.6 cm (64.02\")   Wt 112 kg (248 lb)   SpO2 97%   BMI 42.55 kg/m²       Physical Exam   Constitutional: She is oriented to person, place, and time. She appears well-developed and well-nourished.   HENT:   Head: Atraumatic.   Crowded oropharynx.    Eyes: EOM are normal. Pupils are equal, round, and reactive to light.   Neck:   Increased adipose tissue.    Cardiovascular: Normal rate and regular rhythm.   Pulmonary/Chest: Effort normal. No respiratory distress.   Musculoskeletal:   Gait was normal.   Neurological: She is alert and oriented to person, place, and time.   Psychiatric: She has a normal mood and affect.   Vitals reviewed.      Assessment/Plan   Debi was seen today for follow-up and sleeping problem.    Diagnoses and all orders for this " visit:    ERNA (obstructive sleep apnea)    Morbid obesity, unspecified obesity type (CMS/HCC)    Hypersomnia with sleep apnea    Poor sleep hygiene    Other orders  -     modafinil (PROVIGIL) 100 MG tablet; Take 1 tablet by mouth 2 (Two) Times a Day As Needed (Sleepiness.). 1st dose at 7 AM. 2nd dose if needed. DONOT take 2nd dose after 12 Noon.         Return in about 10 weeks (around 11/27/2019) for Recheck, For Mackenzie.    DISCUSSION (if any):  Continue CPAP at 10 with nasal mask regularly.    Her TSH has remained within normal limit, as per her PCP.     She continues to have symptoms of daytime sleepiness despite being compliant with CPAP and therefore I decided to start on a trial of Provigil 100 mg QAM.    2nd dose at 11 AM PRN.    Arturo was reviewed.    She was advised with regards to contraception, while on this medication.      I also discussed side effects in detail including but not limited to chance of tachycardia and palpitations.  Other major side effects including psychosis, suicidal ideation and hallucinations etc. were also discussed.  I told her that the side effects are rare but important to remember.  I also told her about common side effects of headache, nausea, anxiety, dizziness etc.    If she responds to this, for daytime sleepiness but contineus to have difficulty with insomnia, I will suggest to continue Provigil and reconsider Restoril QHS.       Dictated utilizing Dragon dictation.    This document was electronically signed by Afshan Smith MD on 09/18/19 at 3:25 PM

## 2019-11-27 ENCOUNTER — OFFICE VISIT (OUTPATIENT)
Dept: PULMONOLOGY | Facility: CLINIC | Age: 44
End: 2019-11-27

## 2019-11-27 VITALS
BODY MASS INDEX: 43.02 KG/M2 | HEART RATE: 81 BPM | OXYGEN SATURATION: 98 % | SYSTOLIC BLOOD PRESSURE: 126 MMHG | DIASTOLIC BLOOD PRESSURE: 78 MMHG | WEIGHT: 252 LBS | HEIGHT: 64 IN | RESPIRATION RATE: 16 BRPM

## 2019-11-27 DIAGNOSIS — G47.30 HYPERSOMNIA WITH SLEEP APNEA: ICD-10-CM

## 2019-11-27 DIAGNOSIS — E66.01 MORBID OBESITY, UNSPECIFIED OBESITY TYPE (HCC): ICD-10-CM

## 2019-11-27 DIAGNOSIS — G47.33 OSA (OBSTRUCTIVE SLEEP APNEA): Primary | ICD-10-CM

## 2019-11-27 DIAGNOSIS — G47.10 HYPERSOMNIA WITH SLEEP APNEA: ICD-10-CM

## 2019-11-27 PROCEDURE — 99213 OFFICE O/P EST LOW 20 MIN: CPT | Performed by: NURSE PRACTITIONER

## 2019-11-27 RX ORDER — GLIPIZIDE 2.5 MG/1
2.5 TABLET, EXTENDED RELEASE ORAL DAILY
COMMUNITY
End: 2020-09-03

## 2020-02-19 ENCOUNTER — OFFICE VISIT (OUTPATIENT)
Dept: PULMONOLOGY | Facility: CLINIC | Age: 45
End: 2020-02-19

## 2020-02-19 VITALS
SYSTOLIC BLOOD PRESSURE: 120 MMHG | RESPIRATION RATE: 18 BRPM | OXYGEN SATURATION: 98 % | DIASTOLIC BLOOD PRESSURE: 82 MMHG | HEART RATE: 101 BPM | BODY MASS INDEX: 43.54 KG/M2 | HEIGHT: 64 IN | WEIGHT: 255 LBS

## 2020-02-19 DIAGNOSIS — G47.19 EXCESSIVE DAYTIME SLEEPINESS: ICD-10-CM

## 2020-02-19 DIAGNOSIS — Z72.821 POOR SLEEP HYGIENE: ICD-10-CM

## 2020-02-19 DIAGNOSIS — G47.33 OSA (OBSTRUCTIVE SLEEP APNEA): Primary | ICD-10-CM

## 2020-02-19 PROCEDURE — 99213 OFFICE O/P EST LOW 20 MIN: CPT | Performed by: INTERNAL MEDICINE

## 2020-02-19 RX ORDER — TIZANIDINE 4 MG/1
4 TABLET ORAL NIGHTLY PRN
COMMUNITY

## 2020-02-19 NOTE — PROGRESS NOTES
"Chief Complaint   Patient presents with   • Follow-up   • Sleeping Problem       Subjective   Debi Munoz is a 44 y.o. female.     History of Present Illness   Patient comes back today for follow up of Sleep apnea.     Patient doesn't report any issues with the device. The patient describes no significant issues with her mask either. Patient says that the compliance with the use of the equipment is not good.     Patient says that her symptoms of fatigue & daytime sleepiness continue to persist. She can't really tell a difference in the day when she uses the CPAP vs when she doesn't.    She tried Provigil but feels that it didn't help a whole lot either.     She was recently diagnosed with Fibromyalgia and OA.    She continues to have poor hygiene and also \"forgets\" to put her CPAP on, regularly.     The following portions of the patient's history were reviewed and updated as appropriate: allergies, current medications, past family history, past medical history, past social history and past surgical history.    Review of Systems   HENT: Positive for rhinorrhea and sinus pressure. Negative for sneezing and sore throat.    Respiratory: Positive for wheezing. Negative for cough and shortness of breath.        Objective   Visit Vitals  /82   Pulse 101   Resp 18   Ht 162.6 cm (64.02\")   Wt 116 kg (255 lb)   SpO2 98%   BMI 43.74 kg/m²       Physical Exam   Constitutional: She is oriented to person, place, and time. She appears well-developed and well-nourished.   HENT:   Head: Atraumatic.   Crowded oropharynx.    Eyes: Pupils are equal, round, and reactive to light. EOM are normal.   Neck:   Increased adipose tissue.    Cardiovascular: Normal rate and regular rhythm.   Pulmonary/Chest: Effort normal. No respiratory distress.   Musculoskeletal:   Gait was normal.   Neurological: She is alert and oriented to person, place, and time.   Psychiatric: She has a normal mood and affect.   Vitals " reviewed.    Assessment/Plan   Debi was seen today for follow-up and sleeping problem.    Diagnoses and all orders for this visit:    ERNA (obstructive sleep apnea)    Poor sleep hygiene    Excessive daytime sleepiness         Return in about 6 months (around 8/19/2020) for Recheck.    DISCUSSION (if any):  She has very poor sleep hygiene and also has a daughter who is sick and all of these factors are causing issues.    She was advised to continue trying to be compliant.    Her fatigue could be from multiple reasons especially since there was no change with Provigil and no real discernible improvement even when she used the CPAP regularly.     I spent a total of 20 minutes face to face with this patient. his pertinent current and previous data, as applicable, were reviewed. Patient's diagnostic studies were also reviewed. More than 10 minutes of the time spent, was spent in counseling about patient's underlying disease process, reviewing any available sleep studies, need to use devices (as applicable) on a regular basis and lifestyle modifications that may positively impact patient's health.         Dictated utilizing Dragon dictation.    This document was electronically signed by Afshan Smith MD on 02/19/20 at 1:55 PM

## 2020-08-14 ENCOUNTER — APPOINTMENT (OUTPATIENT)
Dept: ULTRASOUND IMAGING | Facility: HOSPITAL | Age: 45
End: 2020-08-14

## 2020-08-14 ENCOUNTER — HOSPITAL ENCOUNTER (EMERGENCY)
Facility: HOSPITAL | Age: 45
Discharge: HOME | End: 2020-08-14
Attending: EMERGENCY MEDICINE | Admitting: EMERGENCY MEDICINE

## 2020-08-14 VITALS
HEART RATE: 93 BPM | OXYGEN SATURATION: 98 % | HEIGHT: 64 IN | RESPIRATION RATE: 20 BRPM | TEMPERATURE: 98.2 F | SYSTOLIC BLOOD PRESSURE: 106 MMHG | DIASTOLIC BLOOD PRESSURE: 68 MMHG | WEIGHT: 235 LBS | BODY MASS INDEX: 40.12 KG/M2

## 2020-08-14 DIAGNOSIS — R53.83 FATIGUE, UNSPECIFIED TYPE: ICD-10-CM

## 2020-08-14 DIAGNOSIS — N93.9 VAGINAL BLEEDING: Primary | ICD-10-CM

## 2020-08-14 LAB
ALBUMIN SERPL-MCNC: 4.4 G/DL (ref 3.5–5.2)
ALBUMIN/GLOB SERPL: 1.6 G/DL
ALP SERPL-CCNC: 49 U/L (ref 39–117)
ALT SERPL W P-5'-P-CCNC: 60 U/L (ref 1–33)
ANION GAP SERPL CALCULATED.3IONS-SCNC: 13.6 MMOL/L (ref 5–15)
AST SERPL-CCNC: 57 U/L (ref 1–32)
BACTERIA UR QL AUTO: ABNORMAL /HPF
BASOPHILS # BLD AUTO: 0.08 10*3/MM3 (ref 0–0.2)
BASOPHILS NFR BLD AUTO: 0.7 % (ref 0–1.5)
BILIRUB SERPL-MCNC: 0.4 MG/DL (ref 0–1.2)
BILIRUB UR QL STRIP: NEGATIVE
BUN SERPL-MCNC: 18 MG/DL (ref 6–20)
BUN/CREAT SERPL: 18.8 (ref 7–25)
CALCIUM SPEC-SCNC: 9.5 MG/DL (ref 8.6–10.5)
CHLORIDE SERPL-SCNC: 99 MMOL/L (ref 98–107)
CLARITY UR: ABNORMAL
CO2 SERPL-SCNC: 24.4 MMOL/L (ref 22–29)
COLOR UR: YELLOW
CREAT SERPL-MCNC: 0.96 MG/DL (ref 0.57–1)
DEPRECATED RDW RBC AUTO: 42 FL (ref 37–54)
EOSINOPHIL # BLD AUTO: 0.24 10*3/MM3 (ref 0–0.4)
EOSINOPHIL NFR BLD AUTO: 2 % (ref 0.3–6.2)
ERYTHROCYTE [DISTWIDTH] IN BLOOD BY AUTOMATED COUNT: 14.6 % (ref 12.3–15.4)
GFR SERPL CREATININE-BSD FRML MDRD: 63 ML/MIN/1.73
GLOBULIN UR ELPH-MCNC: 2.7 GM/DL
GLUCOSE SERPL-MCNC: 217 MG/DL (ref 65–99)
GLUCOSE UR STRIP-MCNC: NEGATIVE MG/DL
HCT VFR BLD AUTO: 37.5 % (ref 34–46.6)
HGB BLD-MCNC: 12.1 G/DL (ref 12–15.9)
HGB UR QL STRIP.AUTO: ABNORMAL
HOLD SPECIMEN: NORMAL
HOLD SPECIMEN: NORMAL
HYALINE CASTS UR QL AUTO: ABNORMAL /LPF
IMM GRANULOCYTES # BLD AUTO: 0.08 10*3/MM3 (ref 0–0.05)
IMM GRANULOCYTES NFR BLD AUTO: 0.7 % (ref 0–0.5)
KETONES UR QL STRIP: ABNORMAL
LEUKOCYTE ESTERASE UR QL STRIP.AUTO: ABNORMAL
LYMPHOCYTES # BLD AUTO: 2.46 10*3/MM3 (ref 0.7–3.1)
LYMPHOCYTES NFR BLD AUTO: 20.5 % (ref 19.6–45.3)
MCH RBC QN AUTO: 25.7 PG (ref 26.6–33)
MCHC RBC AUTO-ENTMCNC: 32.3 G/DL (ref 31.5–35.7)
MCV RBC AUTO: 79.6 FL (ref 79–97)
MONOCYTES # BLD AUTO: 0.78 10*3/MM3 (ref 0.1–0.9)
MONOCYTES NFR BLD AUTO: 6.5 % (ref 5–12)
NEUTROPHILS NFR BLD AUTO: 69.6 % (ref 42.7–76)
NEUTROPHILS NFR BLD AUTO: 8.36 10*3/MM3 (ref 1.7–7)
NITRITE UR QL STRIP: NEGATIVE
NRBC BLD AUTO-RTO: 0 /100 WBC (ref 0–0.2)
PH UR STRIP.AUTO: 6.5 [PH] (ref 5–8)
PLATELET # BLD AUTO: 445 10*3/MM3 (ref 140–450)
PMV BLD AUTO: 10.4 FL (ref 6–12)
POTASSIUM SERPL-SCNC: 3.6 MMOL/L (ref 3.5–5.2)
PROT SERPL-MCNC: 7.1 G/DL (ref 6–8.5)
PROT UR QL STRIP: ABNORMAL
RBC # BLD AUTO: 4.71 10*6/MM3 (ref 3.77–5.28)
RBC # UR: ABNORMAL /HPF
REF LAB TEST METHOD: ABNORMAL
SODIUM SERPL-SCNC: 137 MMOL/L (ref 136–145)
SP GR UR STRIP: 1.03 (ref 1–1.03)
SQUAMOUS #/AREA URNS HPF: ABNORMAL /HPF
UROBILINOGEN UR QL STRIP: ABNORMAL
WBC # BLD AUTO: 12 10*3/MM3 (ref 3.4–10.8)
WBC UR QL AUTO: ABNORMAL /HPF
WHOLE BLOOD HOLD SPECIMEN: NORMAL
WHOLE BLOOD HOLD SPECIMEN: NORMAL

## 2020-08-14 PROCEDURE — 81001 URINALYSIS AUTO W/SCOPE: CPT | Performed by: EMERGENCY MEDICINE

## 2020-08-14 PROCEDURE — 99283 EMERGENCY DEPT VISIT LOW MDM: CPT

## 2020-08-14 PROCEDURE — 93005 ELECTROCARDIOGRAM TRACING: CPT | Performed by: PHYSICIAN ASSISTANT

## 2020-08-14 PROCEDURE — 80053 COMPREHEN METABOLIC PANEL: CPT | Performed by: PHYSICIAN ASSISTANT

## 2020-08-14 PROCEDURE — 76830 TRANSVAGINAL US NON-OB: CPT

## 2020-08-14 PROCEDURE — 85025 COMPLETE CBC W/AUTO DIFF WBC: CPT | Performed by: PHYSICIAN ASSISTANT

## 2020-08-14 RX ORDER — SODIUM CHLORIDE 0.9 % (FLUSH) 0.9 %
10 SYRINGE (ML) INJECTION AS NEEDED
Status: DISCONTINUED | OUTPATIENT
Start: 2020-08-14 | End: 2020-08-14 | Stop reason: HOSPADM

## 2020-08-14 NOTE — DISCHARGE INSTRUCTIONS
Keep your follow-up with Dr. Mendoza to schedule.  Alternate ibuprofen and Tylenol to help with your pain.  You will need to follow-up with your PCP as early as possible to reevaluate your symptoms.  They may need to do further outpatient testing to help evaluate your fatigue.  Given your age, may need to be referred to cardiology, need Holter monitoring, stress test etc.  You may also need to do further lab work.  Return to the ER for any change in worsening symptoms, or any additional concerns including but not limited to severe or worsening pain, intractable vomiting or fever greater than 100.4, chest pain, difficulty breathing.

## 2020-08-14 NOTE — ED PROVIDER NOTES
Subjective   Patient is a 45-year-old female with history of Bell's palsy, kidney stones, kidney infections and migraines presenting to the ER for evaluation of irregular vaginal bleeding and fatigue.  Patient states that for the past 2 months she has had really heavy periods, bleeding almost daily with clots.  She states that she feels very weak and fatigued.  She states she does have some cramping pains in her back and abdomen that have worsened over the past 3 days..  She states she has been a bit dizzy recently but denies any syncope.  She says she has had some loose stools without blood.  She states that she did make an appointment with an OB/GYN but they had to reschedule her.  She denies any history of abnormal Pap smears but states she has not had one in a while.  Denies any fever, chills, chest pain, difficulty breathing, productive cough, upper abdominal pain, nausea, emesis, dysuria, hematuria, or any other symptoms.  She states she does see a primary care physician, does have thyroid issues but just recently had her medicine decrease.          Review of Systems   Constitutional: Positive for fatigue. Negative for chills and fever.   HENT: Negative.    Eyes: Negative.    Respiratory: Negative.    Cardiovascular: Negative.    Gastrointestinal: Positive for abdominal pain and diarrhea. Negative for blood in stool, nausea and vomiting.   Genitourinary: Positive for vaginal bleeding. Negative for difficulty urinating and dysuria.   Musculoskeletal: Positive for back pain.   Skin: Negative.    Allergic/Immunologic: Negative for immunocompromised state.   Neurological: Negative.    Psychiatric/Behavioral: Negative.        Past Medical History:   Diagnosis Date   • Bell's palsy    • Gallstones    • Infection of kidney    • Migraine headache        No Known Allergies    Past Surgical History:   Procedure Laterality Date   • CARPAL TUNNEL RELEASE Bilateral    •  SECTION     • CHOLECYSTECTOMY     • ELBOW  "ARTHROPLASTY         Family History   Problem Relation Age of Onset   • Arthritis Other    • Cancer Other    • Thyroid disease Other    • Diabetes Other    • Mental illness Other    • Heart attack Other    • Arthritis Mother    • Cancer Mother    • Thyroid disease Mother    • Diabetes Father    • Mental illness Father    • Mental illness Sister    • Heart attack Other    • Cancer Other    • Cancer Other    • Diabetes Daughter        Social History     Socioeconomic History   • Marital status:      Spouse name: Not on file   • Number of children: Not on file   • Years of education: Not on file   • Highest education level: Not on file   Tobacco Use   • Smoking status: Never Smoker   • Smokeless tobacco: Never Used   Substance and Sexual Activity   • Alcohol use: No   • Drug use: No           Objective   Physical Exam   Nursing note and vitals reviewed.  /68 (BP Location: Left arm, Patient Position: Sitting)   Pulse 93   Temp 98.2 °F (36.8 °C) (Oral)   Resp 20   Ht 162.6 cm (64\")   Wt 107 kg (235 lb)   SpO2 98%   BMI 40.34 kg/m²     GEN: No acute distress, sitting upright in the stretcher.  Awake and alert.  Does not appear toxic.  Head: Normocephalic, atraumatic  Eyes: EOM intact  ENT: Mask in place per protocol  Cardiovascular: Regular rate  Lungs: Clear to auscultation bilaterally without adventitious sounds  Abdomen: Nondistended.  Bowel sounds present.  Soft, no focal tenderness to palpation  :  Extremities: No edema, normal appearance, full ROM  Neuro: GCS 15  Psych: Mood and affect are appropriate      Procedures           ED Course  ED Course as of Aug 14 2110   Fri Aug 14, 2020   1322 WBC(!): 12.00 [LA]   1322 Hemoglobin: 12.1 [LA]   1322 Platelets: 445 [LA]   1322 Glucose(!): 217 [LA]   1322 Creatinine: 0.96 [LA]   1322 Sodium: 137 [LA]   1322 Potassium: 3.6 [LA]   1322 Chloride: 99 [LA]   1322 CO2: 24.4 [LA]   1322 Calcium: 9.5 [LA]   1322 Total Protein: 7.1 [LA]   1322 Albumin: 4.40 " [LA]   1322 ALT (SGPT)(!): 60 [LA]   1322 AST (SGOT)(!): 57 [LA]   1322 Alkaline Phosphatase: 49 [LA]   1322 Total Bilirubin: 0.4 [LA]   1322 eGFR Non  Am: 63 [LA]   1322 Globulin: 2.7 [LA]   1322 A/G Ratio: 1.6 [LA]   1322 BUN/Creatinine Ratio: 18.8 [LA]   1322 Anion Gap: 13.6 [LA]   1322 Color, UA: Yellow [LA]   1322 Appearance, UA(!): Cloudy [LA]   1322 pH, UA: 6.5 [LA]   1322 Specific Gravity, UA: 1.026 [LA]   1322 Glucose: Negative [LA]   1322 Ketones, UA(!): 15 mg/dL (1+) [LA]   1322 Bilirubin, UA: Negative [LA]   1322 Blood, UA(!): Large (3+) [LA]   1322 Protein, UA(!): >=300 mg/dL (3+) [LA]   1322 Leukocytes, UA(!): Trace [LA]   1322 Nitrite, UA: Negative [LA]   1322 Urobilinogen, UA: 0.2 E.U./dL [LA]   1322 RBC, UA(!): Too Numerous to Count [LA]   1322 WBC, UA(!): Unable to determine due to loaded field [LA]   1322 Bacteria, UA(!): 1+ [LA]   1322 Squamous Epithelial Cells, UA(!): Unable to determine due to loaded field [LA]   1322 Patient is currently having heavy vaginal bleeding which I believe is causing the hematuria    [LA]   1334 EKG interpreted by me.  Sinus rhythm.  Rate of 87.  Nonspecific T wave changes.  Abnormal EKG    [CG]   1404 Narrative & Impression    PROCEDURE: US NON-OB TRANSVAGINAL-     HISTORY: Heavy vaginal bleeding, fatigue     PROCEDURE:  Sonographic images of the pelvis were obtained  transvaginally.     FINDINGS: The uterus measures  7.7 x 5.2 x 7.2 cm. The endometrium is  6.30 mm . There is a 3.5 cm left ovarian cyst. Both ovaries demonstrate  appropriate blood flow.. There is no significant free fluid.     IMPRESSION:  3.5 cm left ovarian cyst.     This report was finalized on 8/14/2020 2:00 PM by William Liu M.D..        [LA]   6256 Discussed all findings the patient.  She declined a pelvic exam, had low concern for STD. Continues to deny any urinary symptoms. I did also mention and offered a CT scan to rule out any kind of underlying colitis but patient states  the pain is not that bad, will follow up with her PCP.  Discussed other return precautions.    [LA]      ED Course User Index  [CG] Kory Beach B, DO  [LA] Shanti Potts PA-C                                           MDM  Number of Diagnoses or Management Options  Fatigue, unspecified type:   Vaginal bleeding:   Diagnosis management comments: On arrival, patient is stable.  She is mildly tachycardic with a heart rate of 103.  Differential diagnosis would include miscarriage, threatened miscarriage, ectopic pregnancy, neoplasm, dysfunctional uterine bleeding, polyp, anemia, or other concerns.  Will obtain basic labs, urinalysis.  Low concern for cardiac dysrhythmia but given the dizziness will obtain an EKG.  I lower concern for colitis or other intra-abdominal infection given patient has no significant tenderness or focal guarding on exam. H&H is stable at this time, Vital signs not suggestive for life-threatening hemorrhage.  Urine had too many red blood cells to count which made the white blood cell count unable to be determined but patient denies any dysuria.  She had an elevated glucose, mild AST and ALT elevation with normal bilirubin.  A blood cell count was 12, hemoglobin was stable.  OB transvaginal ultrasound showed a 3.5 cm left ovarian cyst with normal blood flow to both ovaries.  Patient was resting comfortably.  I discussed these findings with the patient.  She declined a pelvic exam or STD testing.  Her abdominal exam remained benign with no severe focal guarding.  I did offer obtain a CT scan to make sure there is no underlying colitis but patient declined and states she will follow-up with OB/GYN.  Discussed symptomatic treatment, follow-up and strict return precautions.  She verbalized understanding and was in agreement with this plan of care.       Amount and/or Complexity of Data Reviewed  Clinical lab tests: reviewed and ordered  Tests in the radiology section of CPT®: reviewed and  ordered  Review and summarize past medical records: yes  Discuss the patient with other providers: yes    Risk of Complications, Morbidity, and/or Mortality  Presenting problems: moderate  Diagnostic procedures: moderate  Management options: low    Patient Progress  Patient progress: stable      Final diagnoses:   Vaginal bleeding   Fatigue, unspecified type            Shanti Potts PA-C  08/14/20 2110

## 2020-08-26 ENCOUNTER — OFFICE VISIT (OUTPATIENT)
Dept: PULMONOLOGY | Facility: CLINIC | Age: 45
End: 2020-08-26

## 2020-08-26 VITALS
SYSTOLIC BLOOD PRESSURE: 140 MMHG | RESPIRATION RATE: 16 BRPM | DIASTOLIC BLOOD PRESSURE: 92 MMHG | BODY MASS INDEX: 41.48 KG/M2 | WEIGHT: 243 LBS | OXYGEN SATURATION: 93 % | HEART RATE: 92 BPM | HEIGHT: 64 IN | TEMPERATURE: 96 F

## 2020-08-26 DIAGNOSIS — Z72.821 POOR SLEEP HYGIENE: ICD-10-CM

## 2020-08-26 DIAGNOSIS — G47.00 INSOMNIA, UNSPECIFIED TYPE: ICD-10-CM

## 2020-08-26 DIAGNOSIS — G47.33 OSA (OBSTRUCTIVE SLEEP APNEA): Primary | ICD-10-CM

## 2020-08-26 PROCEDURE — 99214 OFFICE O/P EST MOD 30 MIN: CPT | Performed by: INTERNAL MEDICINE

## 2020-08-26 RX ORDER — LISINOPRIL AND HYDROCHLOROTHIAZIDE 20; 12.5 MG/1; MG/1
1 TABLET ORAL DAILY
COMMUNITY
End: 2021-03-02

## 2020-08-26 RX ORDER — TEMAZEPAM 15 MG/1
15 CAPSULE ORAL NIGHTLY PRN
Qty: 30 CAPSULE | Refills: 2 | Status: SHIPPED | OUTPATIENT
Start: 2020-08-26 | End: 2020-12-22 | Stop reason: SDUPTHER

## 2020-08-26 RX ORDER — DULOXETIN HYDROCHLORIDE 60 MG/1
60 CAPSULE, DELAYED RELEASE ORAL 2 TIMES DAILY
COMMUNITY

## 2020-08-26 NOTE — PROGRESS NOTES
"Chief Complaint   Patient presents with   • Follow-up   • Sleeping Problem       Subjective   Debi Munoz is a 45 y.o. female.     History of Present Illness   Patient comes in today to follow-up on obstructive sleep apnea.    The patient says that she is trying to use her CPAP device on a regular basis but describes multiple issues that keep her \"mind racing\".    The patient feels that her  snoring also makes her sleep really restless.  She also notices occasional issues with her own legs that \"kick a lot\".    The patient says even when she does fall asleep, she wakes up multiple times throughout the night.    The following portions of the patient's history were reviewed and updated as appropriate: allergies, current medications, past family history, past medical history, past social history and past surgical history.    Review of Systems   Constitutional: Positive for fatigue.   HENT: Negative for sinus pain, sore throat and trouble swallowing.    Respiratory: Positive for shortness of breath. Negative for cough, chest tightness and wheezing.        Objective   Visit Vitals  /92   Pulse 92   Temp 96 °F (35.6 °C)   Resp 16   Ht 162.6 cm (64.02\")   Wt 110 kg (243 lb)   SpO2 93%   BMI 41.69 kg/m²       Physical Exam   Constitutional: She is oriented to person, place, and time. She appears well-developed and well-nourished.   HENT:   Head: Atraumatic.   Crowded oropharynx.    Eyes: Pupils are equal, round, and reactive to light. EOM are normal.   Neck:   Increased adipose tissue.    Cardiovascular: Normal rate and regular rhythm.   Pulmonary/Chest: Effort normal. No respiratory distress.   Musculoskeletal:   Gait was normal.   Neurological: She is alert and oriented to person, place, and time.   Psychiatric: She has a normal mood and affect.   Vitals reviewed.      Assessment/Plan   Debi was seen today for follow-up and sleeping problem.    Diagnoses and all orders for this visit:    ERNA " (obstructive sleep apnea)    Poor sleep hygiene    Insomnia, unspecified type  -     temazepam (RESTORIL) 15 MG capsule; Take 1 capsule by mouth At Night As Needed for Sleep.           Return in about 4 months (around 12/26/2020) for Recheck, Overbook.    DISCUSSION (if any):  Sleep hygiene measures were discussed with the patient in great detail.    The patient was told that she will need to follow a strict time to go to bed as well as a fixed time to get out of bed.    Multiple measures were discussed including sleep restriction and she was strongly encouraged to follow recommendations to avoid naps.    Due to significant ongoing symptoms, we will start the patient on Restoril.     Side effects of prescribed medication were discussed.    Arturo will be obtained.    Asked the patient to explore the possibility of having the  move out of the room for a few days to see if that makes a difference.     She apparently has multiple issues that disrupt her sleep.    I told her that the procedure will be gradual but she needs to start working on her sleep hygiene as soon as possible.    I told the patient that her symptoms are consistent with poorly controlled sleep apnea.    Patient was advised to continue using PAP for at least 4 hours every night.    Patient was advised to call this office with any issues.    I spent a total of 28 minutes face to face with this patient. her  pertinent current and previous data, as applicable, were reviewed. Patient's diagnostic studies were also reviewed. More than 16 minutes of the time spent, was spent in counseling about patient's underlying disease process, reviewing any available sleep studies, need to use devices (as applicable) on a regular basis and lifestyle modifications that may positively impact patient's health.         Dictated utilizing Dragon dictation.    This document was electronically signed by Afshan Smith MD on 08/26/20 at 15:30

## 2020-09-03 ENCOUNTER — OFFICE VISIT (OUTPATIENT)
Dept: OBSTETRICS AND GYNECOLOGY | Facility: CLINIC | Age: 45
End: 2020-09-03

## 2020-09-03 VITALS
WEIGHT: 242 LBS | HEIGHT: 64 IN | DIASTOLIC BLOOD PRESSURE: 86 MMHG | BODY MASS INDEX: 41.32 KG/M2 | SYSTOLIC BLOOD PRESSURE: 130 MMHG

## 2020-09-03 DIAGNOSIS — Z12.39 SCREENING FOR BREAST CANCER: ICD-10-CM

## 2020-09-03 DIAGNOSIS — R10.2 PELVIC PAIN: ICD-10-CM

## 2020-09-03 DIAGNOSIS — N91.5 OLIGOMENORRHEA, UNSPECIFIED TYPE: ICD-10-CM

## 2020-09-03 DIAGNOSIS — N92.1 MENORRHAGIA WITH IRREGULAR CYCLE: Primary | ICD-10-CM

## 2020-09-03 DIAGNOSIS — N83.202 LEFT OVARIAN CYST: ICD-10-CM

## 2020-09-03 PROCEDURE — 99203 OFFICE O/P NEW LOW 30 MIN: CPT | Performed by: PHYSICIAN ASSISTANT

## 2020-09-03 NOTE — PROGRESS NOTES
Subjective   Chief Complaint   Patient presents with   • Pelvic Pain     Patient went to the ER on 20 with excessive bleeding.  TVS performed that shows an ovarian cyst.  Patient states bleeding has stopped but pain has gotten worse   • Menstrual Problem     Heavy, irregular menses       Debi Munoz is a 45 y.o. year old new patient  presenting to be seen for complaints of irregular menses, prolonged menses, lower pelvic pain and ovarian cyst.  Patient was seen in the emergency room on 2020 due to prolonged bleeding.  Transvaginal pelvic ultrasound was done which is reviewed in chart.  It noted a normal-appearing uterus measuring 7.7 x 5.2 x 7.2 cm, her endometrium was 6.3 mm, left ovary had a 3.5 cm cyst.  Right ovary appeared normal.  No free fluid.  Patient relays a history of oligomenorrhea that has been long-term.  She has a history of going 2 to 3 months without menses in the past and sometimes longer.  She reports she recently had bled for 2 months almost daily bleeding.  The bleeding stopped 2 weeks ago.  She is still having some pain and cramping in the left lower quadrant.  She is unsure when her last Pap smear was but it is been several years ago.  She has never had a screening mammogram.  She is on thyroid medication and states her dose was adjusted 2 months ago.  Vasectomy for birth control.  She has had 3 previous  sections.  A CBC done during her ER visit 2020 did not show any anemia.    Past Medical History:   Diagnosis Date   • Anxiety    • Bell's palsy    • Bipolar disorder (CMS/[facility])    • Depression    • Diabetes mellitus (CMS/[facility]) 2020   • Disease of thyroid gland    • Gallstones    • Gestational diabetes    • Hypertension    • Infection of kidney    • Migraine headache    • Ovarian cyst         Current Outpatient Medications:   •  BYSTOLIC 10 MG tablet, Take 1 tablet by mouth Daily., Disp: 30 tablet, Rfl: 0  •  DULoxetine (CYMBALTA) 60 MG capsule, duloxetine  60 mg capsule,delayed release  TK 1 C PO BID, Disp: , Rfl:   •  ibuprofen (ADVIL,MOTRIN) 800 MG tablet, Take 1 tablet by mouth Every 8 (Eight) Hours As Needed for Mild Pain ., Disp: 90 tablet, Rfl: 11  •  lisinopril-hydrochlorothiazide (PRINZIDE,ZESTORETIC) 20-12.5 MG per tablet, Take 1 tablet by mouth Daily., Disp: , Rfl:   •  LORazepam (ATIVAN) 0.5 MG tablet, Take 0.5 mg by mouth daily., Disp: , Rfl: 0  •  SITagliptin-metFORMIN HCl ER (Janumet XR)  MG tablet, Take 1 tablet by mouth Daily., Disp: , Rfl:   •  temazepam (RESTORIL) 15 MG capsule, Take 1 capsule by mouth At Night As Needed for Sleep., Disp: 30 capsule, Rfl: 2  •  tiZANidine (ZANAFLEX) 4 MG tablet, Take 4 mg by mouth At Night As Needed for Muscle Spasms., Disp: , Rfl:   •  vitamin D (ERGOCALCIFEROL) 76028 UNITS capsule capsule, Take 1 capsule by mouth 1 (One) Time Per Week., Disp: 12 capsule, Rfl: 3  •  DULoxetine (CYMBALTA) 60 MG capsule, TK 1 C PO ONCE DAILY WITH A 30MG CAPSULE TO EQUAL 90MG PER DAY, Disp: , Rfl: 1  •  levothyroxine (SYNTHROID, LEVOTHROID) 200 MCG tablet, Take 1 tablet by mouth Daily. (Patient taking differently: Take 175 mcg by mouth Daily.), Disp: 30 tablet, Rfl: 11   No Known Allergies   Past Surgical History:   Procedure Laterality Date   • CARPAL TUNNEL RELEASE Bilateral    •  SECTION     • CHOLECYSTECTOMY     • ELBOW ARTHROPLASTY     • LAPAROSCOPIC CHOLECYSTECTOMY        Social History     Socioeconomic History   • Marital status:      Spouse name: Not on file   • Number of children: Not on file   • Years of education: Not on file   • Highest education level: Not on file   Tobacco Use   • Smoking status: Never Smoker   • Smokeless tobacco: Never Used   Substance and Sexual Activity   • Alcohol use: No   • Drug use: No   • Sexual activity: Yes     Partners: Male     Birth control/protection: None     Comment: vasectomy      Family History   Problem Relation Age of Onset   • Arthritis Other    • Cancer Other   "  • Thyroid disease Other    • Diabetes Other    • Mental illness Other    • Heart attack Other    • Arthritis Mother    • Cancer Mother    • Thyroid disease Mother    • Diabetes Father    • Mental illness Father    • Mental illness Sister    • Heart attack Other    • Cancer Other    • Cancer Other    • Diabetes Daughter        Review of Systems   Gastrointestinal: Positive for diarrhea and nausea.   Genitourinary: Positive for menstrual problem and pelvic pain.   Musculoskeletal: Positive for arthralgias.   Psychiatric/Behavioral: Positive for sleep disturbance.   All other systems reviewed and are negative.          Objective   /86   Ht 162.6 cm (64.02\")   Wt 110 kg (242 lb)   Breastfeeding No   BMI 41.51 kg/m²     Physical Exam   Constitutional: She is cooperative. No distress.   Eyes: Conjunctivae, EOM and lids are normal.   Cardiovascular: Normal rate, regular rhythm and normal heart sounds.   Pulmonary/Chest: Effort normal and breath sounds normal.   Abdominal: Soft. Normal appearance. There is no tenderness. There is no rigidity and no guarding.   Genitourinary: Uterus normal. There is no tenderness or lesion on the right labia. There is no tenderness or lesion on the left labia. Cervix exhibits no motion tenderness, no discharge and no friability. Right adnexum displays no mass and no tenderness. Left adnexum displays no mass and no tenderness. No erythema or tenderness in the vagina. No vaginal discharge found.   Musculoskeletal: Normal range of motion.   Neurological: She is alert.   Skin: Skin is warm and dry. No lesion and no rash noted.   Psychiatric: She has a normal mood and affect. Her behavior is normal. Thought content normal.            Assessment and Plan  Debi was seen today for pelvic pain and menstrual problem.    Diagnoses and all orders for this visit:    Menorrhagia with irregular cycle    Oligomenorrhea, unspecified type    Left ovarian cyst  -     US Non-ob " Transvaginal    Pelvic pain  -     US Non-ob Transvaginal    Screening for breast cancer  -     Mammo Screening Digital Tomosynthesis Bilateral With CAD; Future      Patient Instructions   Patient will follow-up in 2 weeks for transvaginal pelvic ultrasound to reassess the ovarian cyst.  We will also discuss endometrial sampling at that time.  We will plan on performing an annual and Pap smear as well and patient will be scheduled for a screening mammogram.             This note was electronically signed.    Jessica Webb PA-C   September 4, 2020

## 2020-09-04 NOTE — PATIENT INSTRUCTIONS
Patient will follow-up in 2 weeks for transvaginal pelvic ultrasound to reassess the ovarian cyst.  We will also discuss endometrial sampling at that time.  We will plan on performing an annual and Pap smear as well and patient will be scheduled for a screening mammogram.

## 2020-09-14 ENCOUNTER — HOSPITAL ENCOUNTER (OUTPATIENT)
Dept: MAMMOGRAPHY | Facility: HOSPITAL | Age: 45
Discharge: HOME | End: 2020-09-14
Admitting: PHYSICIAN ASSISTANT

## 2020-09-14 DIAGNOSIS — Z12.39 SCREENING FOR BREAST CANCER: ICD-10-CM

## 2020-09-14 PROCEDURE — 77067 SCR MAMMO BI INCL CAD: CPT

## 2020-09-14 PROCEDURE — 77063 BREAST TOMOSYNTHESIS BI: CPT

## 2020-09-17 ENCOUNTER — OFFICE VISIT (OUTPATIENT)
Dept: OBSTETRICS AND GYNECOLOGY | Facility: CLINIC | Age: 45
End: 2020-09-17

## 2020-09-17 VITALS
SYSTOLIC BLOOD PRESSURE: 126 MMHG | BODY MASS INDEX: 41.25 KG/M2 | DIASTOLIC BLOOD PRESSURE: 82 MMHG | HEIGHT: 64 IN | WEIGHT: 241.6 LBS

## 2020-09-17 DIAGNOSIS — N92.1 MENORRHAGIA WITH IRREGULAR CYCLE: Primary | ICD-10-CM

## 2020-09-17 DIAGNOSIS — N83.202 LEFT OVARIAN CYST: ICD-10-CM

## 2020-09-17 PROCEDURE — 58100 BIOPSY OF UTERUS LINING: CPT | Performed by: PHYSICIAN ASSISTANT

## 2020-09-17 PROCEDURE — 99213 OFFICE O/P EST LOW 20 MIN: CPT | Performed by: PHYSICIAN ASSISTANT

## 2020-09-18 NOTE — PROGRESS NOTES
Subjective   Chief Complaint   Patient presents with   • Follow-up     TVS for menorrhagia       Debi Munoz is a 45 y.o. year old  presenting to be seen for follow-up menorrhagia.  She was seen 2 weeks ago following up from recent ER visit for prolonged menorrhagia.  She has not had any further bleeding since her recent visit.  Patient reported a long-term history of irregular menses and oligomenorrhea.  She would go 2 to 3 months without menses and then would have some prolonged bleeding episodes.  Most recently she had bled 2 months almost 3 months when she had gone to the ER.  She had had a pelvic ultrasound done in the ER which noted a normal-appearing uterus.  She had a 3.5 cm left ovarian cyst.  Her vaginal bleeding had stopped at the time of her office visit 2 weeks ago.  A CBC done in the ER did not reveal the patient to be anemic.  Repeat Pelvic ultrasound is done today and reviewed with patient.  It notes a retroverted normal-appearing uterus, her endometrium is 12 mm with possible bifurcation in the fundus.  Her right ovary appears normal.  Her left ovary has a 3.6 cm simple cyst.  There is a trace of free fluid. Ultrasound images reviewed by myself.  Endometrial biopsy is done per separate note.  She continues to have some left lower quadrant pain and cramping.    The patient had been advised that we would do an annual exam and Pap smear at this visit however she was not scheduled for an annual and transvaginal pelvic ultrasound had been done on the patient before I had seen her.    Past Medical History:   Diagnosis Date   • Anxiety    • Bell's palsy    • Bipolar disorder (CMS/[facility])    • Depression    • Diabetes mellitus (CMS/[facility]) 2020   • Disease of thyroid gland    • Gallstones    • Gestational diabetes    • Hypertension    • Infection of kidney    • Migraine headache    • Ovarian cyst         Current Outpatient Medications:   •  BYSTOLIC 10 MG tablet, Take 1 tablet by mouth Daily.,  Disp: 30 tablet, Rfl: 0  •  DULoxetine (CYMBALTA) 60 MG capsule, duloxetine 60 mg capsule,delayed release  TK 1 C PO BID, Disp: , Rfl:   •  ibuprofen (ADVIL,MOTRIN) 800 MG tablet, Take 1 tablet by mouth Every 8 (Eight) Hours As Needed for Mild Pain ., Disp: 90 tablet, Rfl: 11  •  levothyroxine (SYNTHROID, LEVOTHROID) 200 MCG tablet, Take 1 tablet by mouth Daily. (Patient taking differently: Take 175 mcg by mouth Daily.), Disp: 30 tablet, Rfl: 11  •  lisinopril-hydrochlorothiazide (PRINZIDE,ZESTORETIC) 20-12.5 MG per tablet, Take 1 tablet by mouth Daily., Disp: , Rfl:   •  LORazepam (ATIVAN) 0.5 MG tablet, Take 0.5 mg by mouth daily., Disp: , Rfl: 0  •  SITagliptin-metFORMIN HCl ER (Janumet XR)  MG tablet, Take 1 tablet by mouth Daily., Disp: , Rfl:   •  temazepam (RESTORIL) 15 MG capsule, Take 1 capsule by mouth At Night As Needed for Sleep., Disp: 30 capsule, Rfl: 2  •  tiZANidine (ZANAFLEX) 4 MG tablet, Take 4 mg by mouth At Night As Needed for Muscle Spasms., Disp: , Rfl:   •  vitamin D (ERGOCALCIFEROL) 12518 UNITS capsule capsule, Take 1 capsule by mouth 1 (One) Time Per Week., Disp: 12 capsule, Rfl: 3  •  DULoxetine (CYMBALTA) 60 MG capsule, TK 1 C PO ONCE DAILY WITH A 30MG CAPSULE TO EQUAL 90MG PER DAY, Disp: , Rfl: 1   No Known Allergies   Past Surgical History:   Procedure Laterality Date   • CARPAL TUNNEL RELEASE Bilateral    •  SECTION     • CHOLECYSTECTOMY     • ELBOW ARTHROPLASTY     • LAPAROSCOPIC CHOLECYSTECTOMY        Social History     Socioeconomic History   • Marital status:      Spouse name: Not on file   • Number of children: Not on file   • Years of education: Not on file   • Highest education level: Not on file   Social Needs   • Financial resource strain: Not hard at all   • Food insecurity     Worry: Never true     Inability: Never true   • Transportation needs     Medical: No     Non-medical: No   Tobacco Use   • Smoking status: Never Smoker   • Smokeless tobacco: Never  "Used   Substance and Sexual Activity   • Alcohol use: No   • Drug use: No   • Sexual activity: Yes     Partners: Male     Birth control/protection: None     Comment: vasectomy   Lifestyle   • Physical activity     Days per week: 0 days     Minutes per session: 0 min   • Stress: Only a little      Family History   Problem Relation Age of Onset   • Arthritis Other    • Cancer Other    • Thyroid disease Other    • Diabetes Other    • Mental illness Other    • Heart attack Other    • Arthritis Mother    • Cancer Mother    • Thyroid disease Mother    • Diabetes Father    • Mental illness Father    • Mental illness Sister    • Heart attack Other    • Cancer Other    • Cancer Other    • Diabetes Daughter        Review of Systems   Constitutional: Negative for chills, fatigue and fever.   Gastrointestinal: Negative for abdominal pain, nausea and vomiting.   Genitourinary: Positive for menstrual problem. Negative for dysuria, pelvic pain and vaginal discharge.           Objective   /82   Ht 162.6 cm (64.02\")   Wt 110 kg (241 lb 9.6 oz)   LMP 08/20/2020 (Approximate)   Breastfeeding No   BMI 41.44 kg/m²     Physical Exam         Assessment and Plan  Debi was seen today for follow-up.    Diagnoses and all orders for this visit:    Menorrhagia with irregular cycle    Left ovarian cyst      Patient Instructions   Patient will return in about 1 week for annual exam and Pap smear.  Endometrial biopsy is done today and will review results when she returns.  Patient states she is desiring definitive management of her abnormal bleeding and is interested in an ablation.             This note was electronically signed.    Jessica Webb PA-C   September 18, 2020  "

## 2020-09-18 NOTE — PROGRESS NOTES
Endometrial Biopsy    Date of procedure:  9/18/2020    Indication:                                    menorrhagia    Procedure documentation:    After informed consent obtained, patient placed in the supine position.  Cervix was cleansed with Betadine.  He cervix was grasped anterior at the 12 o'clock position.  The cavity sounded to 6 centimeters.  An endometrial biopsy specimen was obtained and multiple passes.  The tissue was sent for permanent histopathologic evaluation.  Tenaculum was removed from the cervix with scant bleeding. She tolerated the procedure well    Post procedure instructions: She was instructed to call us in 1 week's time if she has not heard from us otherwise.  If there is any significant fever or excessive bleeding or pain she is to call immediately.    Follow up  prn    This note was electronically signed.    Jessica Webb PA-C  September 18, 2020

## 2020-09-18 NOTE — PATIENT INSTRUCTIONS
Patient will return in about 1 week for annual exam and Pap smear.  Endometrial biopsy is done today and will review results when she returns.  Patient states she is desiring definitive management of her abnormal bleeding and is interested in an ablation.

## 2020-09-23 DIAGNOSIS — N92.1 MENORRHAGIA WITH IRREGULAR CYCLE: ICD-10-CM

## 2020-09-24 ENCOUNTER — OFFICE VISIT (OUTPATIENT)
Dept: OBSTETRICS AND GYNECOLOGY | Facility: CLINIC | Age: 45
End: 2020-09-24

## 2020-09-24 VITALS
BODY MASS INDEX: 41.04 KG/M2 | SYSTOLIC BLOOD PRESSURE: 138 MMHG | DIASTOLIC BLOOD PRESSURE: 90 MMHG | HEIGHT: 64 IN | WEIGHT: 240.4 LBS

## 2020-09-24 DIAGNOSIS — N92.1 MENORRHAGIA WITH IRREGULAR CYCLE: ICD-10-CM

## 2020-09-24 DIAGNOSIS — Z01.419 ENCOUNTER FOR GYNECOLOGICAL EXAMINATION WITHOUT ABNORMAL FINDING: Primary | ICD-10-CM

## 2020-09-24 DIAGNOSIS — Z12.4 SCREENING FOR CERVICAL CANCER: ICD-10-CM

## 2020-09-24 DIAGNOSIS — N83.202 LEFT OVARIAN CYST: ICD-10-CM

## 2020-09-24 DIAGNOSIS — R10.2 PELVIC PAIN: ICD-10-CM

## 2020-09-24 PROCEDURE — 99396 PREV VISIT EST AGE 40-64: CPT | Performed by: PHYSICIAN ASSISTANT

## 2020-09-24 NOTE — PATIENT INSTRUCTIONS
Patient is encouraged to do a self breast exam monthly.  Advised annual screening mammograms.  encourage regular exercise.  Pending normal pap will consult MD regarding scheduling  D&C, hysteroscopy, Novasure endometrial ablation, diagnostic laparoscopy with removal of left ovarian cyst

## 2020-09-24 NOTE — PROGRESS NOTES
Subjective   Chief Complaint   Patient presents with   • Gynecologic Exam     Annual with pap, No Complaints       Debikelby Munoz is a 45 y.o. year old  presenting to be seen for her annual gynecological exam.   She has no complaints today.  She had a normal screening mammogram 2020.  She has been recently evaluated for menorrhagia with irregular cycles and a 3.6 cm simple left ovarian cyst that has been persistent.  Endometrial biopsy was done last week showing very limited specimen noting benign endocervical glandular tissue however there was no endometrium present for evaluation.  Patient reports at this time her last bleeding has been .  Last pap has been several years ago.    Past Medical History:   Diagnosis Date   • Anxiety    • Bell's palsy    • Bipolar disorder (CMS/[facility])    • Depression    • Diabetes mellitus (CMS/[facility]) 2020   • Disease of thyroid gland    • Gallstones    • Gestational diabetes    • Hypertension    • Infection of kidney    • Migraine headache    • Ovarian cyst         Current Outpatient Medications:   •  BYSTOLIC 10 MG tablet, Take 1 tablet by mouth Daily., Disp: 30 tablet, Rfl: 0  •  DULoxetine (CYMBALTA) 60 MG capsule, duloxetine 60 mg capsule,delayed release  TK 1 C PO BID, Disp: , Rfl:   •  ibuprofen (ADVIL,MOTRIN) 800 MG tablet, Take 1 tablet by mouth Every 8 (Eight) Hours As Needed for Mild Pain ., Disp: 90 tablet, Rfl: 11  •  levothyroxine (SYNTHROID, LEVOTHROID) 200 MCG tablet, Take 1 tablet by mouth Daily. (Patient taking differently: Take 175 mcg by mouth Daily.), Disp: 30 tablet, Rfl: 11  •  lisinopril-hydrochlorothiazide (PRINZIDE,ZESTORETIC) 20-12.5 MG per tablet, Take 1 tablet by mouth Daily., Disp: , Rfl:   •  LORazepam (ATIVAN) 0.5 MG tablet, Take 0.5 mg by mouth daily., Disp: , Rfl: 0  •  SITagliptin-metFORMIN HCl ER (Janumet XR)  MG tablet, Take 1 tablet by mouth Daily., Disp: , Rfl:   •  temazepam (RESTORIL) 15 MG capsule, Take 1 capsule by  mouth At Night As Needed for Sleep., Disp: 30 capsule, Rfl: 2  •  tiZANidine (ZANAFLEX) 4 MG tablet, Take 4 mg by mouth At Night As Needed for Muscle Spasms., Disp: , Rfl:   •  vitamin D (ERGOCALCIFEROL) 11916 UNITS capsule capsule, Take 1 capsule by mouth 1 (One) Time Per Week., Disp: 12 capsule, Rfl: 3   No Known Allergies   Past Surgical History:   Procedure Laterality Date   • CARPAL TUNNEL RELEASE Bilateral    •  SECTION     • CHOLECYSTECTOMY     • ELBOW ARTHROPLASTY     • LAPAROSCOPIC CHOLECYSTECTOMY        Social History     Socioeconomic History   • Marital status:      Spouse name: Not on file   • Number of children: Not on file   • Years of education: Not on file   • Highest education level: Not on file   Social Needs   • Financial resource strain: Not hard at all   • Food insecurity     Worry: Never true     Inability: Never true   • Transportation needs     Medical: No     Non-medical: No   Tobacco Use   • Smoking status: Never Smoker   • Smokeless tobacco: Never Used   Substance and Sexual Activity   • Alcohol use: No   • Drug use: No   • Sexual activity: Yes     Partners: Male     Birth control/protection: None     Comment: vasectomy   Lifestyle   • Physical activity     Days per week: 0 days     Minutes per session: 0 min   • Stress: Only a little      Family History   Problem Relation Age of Onset   • Arthritis Other    • Cancer Other    • Thyroid disease Other    • Diabetes Other    • Mental illness Other    • Heart attack Other    • Arthritis Mother    • Cancer Mother    • Thyroid disease Mother    • Diabetes Father    • Mental illness Father    • Mental illness Sister    • Heart attack Other    • Cancer Other    • Cancer Other    • Diabetes Daughter        Review of Systems   Constitutional: Negative for chills, diaphoresis and fever.   Gastrointestinal: Negative for nausea and vomiting.   Genitourinary: Positive for menstrual problem and pelvic pain. Negative for difficulty  "urinating, dysuria and vaginal discharge.   All other systems reviewed and are negative.          Objective   /90   Ht 163.1 cm (64.2\")   Wt 109 kg (240 lb 6.4 oz)   LMP  (LMP Unknown)   Breastfeeding No   BMI 41.01 kg/m²     Physical Exam  Constitutional:       Appearance: Normal appearance. She is well-groomed. She is obese.   Eyes:      General: Lids are normal.      Extraocular Movements: Extraocular movements intact.      Conjunctiva/sclera: Conjunctivae normal.   Chest:      Breasts:         Right: No inverted nipple, mass, nipple discharge, skin change or tenderness.         Left: No inverted nipple, mass, nipple discharge, skin change or tenderness.   Abdominal:      Palpations: Abdomen is soft.      Tenderness: There is no abdominal tenderness. There is no guarding.   Genitourinary:     General: Normal vulva.      Vagina: Normal.      Cervix: No cervical motion tenderness, discharge, friability, lesion or cervical bleeding.      Uterus: Normal.       Adnexa:         Right: No mass or tenderness.          Left: No mass or tenderness.        Comments: Pap done  Musculoskeletal: Normal range of motion.   Skin:     General: Skin is warm and dry.      Findings: No lesion or rash.   Neurological:      Mental Status: She is alert and oriented to person, place, and time.   Psychiatric:         Attention and Perception: Attention normal.         Mood and Affect: Mood normal.         Speech: Speech normal.         Behavior: Behavior is cooperative.         Thought Content: Thought content normal.              Assessment and Plan  Debi was seen today for gynecologic exam.    Diagnoses and all orders for this visit:    Encounter for gynecological examination without abnormal finding    Screening for cervical cancer  -     Liquid-based Pap Smear, Screening; Future    Menorrhagia with irregular cycle    Left ovarian cyst    Pelvic pain      Patient Instructions   Patient is encouraged to do a self breast " exam monthly.  Advised annual screening mammograms.  encourage regular exercise.  Pending normal pap will consult MD regarding scheduling  D&C, hysteroscopy, Novasure endometrial ablation, diagnostic laparoscopy with removal of left ovarian cyst                This note was electronically signed.    Jessica Webb PA-C   September 25, 2020

## 2020-10-05 DIAGNOSIS — Z12.4 SCREENING FOR CERVICAL CANCER: ICD-10-CM

## 2020-10-06 ENCOUNTER — PREP FOR SURGERY (OUTPATIENT)
Dept: OTHER | Facility: HOSPITAL | Age: 45
End: 2020-10-06

## 2020-10-06 ENCOUNTER — TELEPHONE (OUTPATIENT)
Dept: OBSTETRICS AND GYNECOLOGY | Facility: CLINIC | Age: 45
End: 2020-10-06

## 2020-10-06 DIAGNOSIS — N83.202 LEFT OVARIAN CYST: ICD-10-CM

## 2020-10-06 DIAGNOSIS — N92.1 MENORRHAGIA WITH IRREGULAR CYCLE: Primary | ICD-10-CM

## 2020-10-06 RX ORDER — SODIUM CHLORIDE 0.9 % (FLUSH) 0.9 %
10 SYRINGE (ML) INJECTION AS NEEDED
Status: CANCELLED | OUTPATIENT
Start: 2020-10-06

## 2020-10-06 RX ORDER — SODIUM CHLORIDE 0.9 % (FLUSH) 0.9 %
3 SYRINGE (ML) INJECTION EVERY 12 HOURS SCHEDULED
Status: CANCELLED | OUTPATIENT
Start: 2020-10-06

## 2020-10-06 NOTE — TELEPHONE ENCOUNTER
Pap has returned normal today. patient informed and case request placed  AS      ----- Message from Liz Mendoza MD sent at 9/25/2020 10:23 AM EDT -----  Yes okay to schedule as long as patient is aware; thank you.  ----- Message -----  From: Jessica Webb PA-C  Sent: 9/25/2020   8:36 AM EDT  To: Liz Mendoza MD    Patient is requesting endometrial ablation be done for her bleeding.  on ultrasound there is possible bifurcation of endometrium high in the fundus.  Is it okay to schedule this and attempt ablation?  Thank you

## 2020-10-07 PROBLEM — N92.1 MENORRHAGIA WITH IRREGULAR CYCLE: Status: ACTIVE | Noted: 2020-10-07

## 2020-10-07 PROBLEM — N83.202 LEFT OVARIAN CYST: Status: ACTIVE | Noted: 2020-10-07

## 2020-10-12 ENCOUNTER — APPOINTMENT (OUTPATIENT)
Dept: PREADMISSION TESTING | Facility: HOSPITAL | Age: 45
End: 2020-10-12

## 2020-10-12 VITALS
DIASTOLIC BLOOD PRESSURE: 84 MMHG | WEIGHT: 237.8 LBS | OXYGEN SATURATION: 99 % | HEART RATE: 84 BPM | SYSTOLIC BLOOD PRESSURE: 132 MMHG | BODY MASS INDEX: 40.6 KG/M2 | HEIGHT: 64 IN

## 2020-10-12 DIAGNOSIS — N83.202 LEFT OVARIAN CYST: ICD-10-CM

## 2020-10-12 DIAGNOSIS — N92.1 MENORRHAGIA WITH IRREGULAR CYCLE: ICD-10-CM

## 2020-10-12 LAB
ANION GAP SERPL CALCULATED.3IONS-SCNC: 12 MMOL/L (ref 5–15)
B-HCG UR QL: NEGATIVE
BACTERIA UR QL AUTO: ABNORMAL /HPF
BASOPHILS # BLD AUTO: 0.08 10*3/MM3 (ref 0–0.2)
BASOPHILS NFR BLD AUTO: 0.6 % (ref 0–1.5)
BILIRUB UR QL STRIP: NEGATIVE
BUN SERPL-MCNC: 12 MG/DL (ref 6–20)
BUN/CREAT SERPL: 15.6 (ref 7–25)
CALCIUM SPEC-SCNC: 10.1 MG/DL (ref 8.6–10.5)
CHLORIDE SERPL-SCNC: 100 MMOL/L (ref 98–107)
CLARITY UR: ABNORMAL
CO2 SERPL-SCNC: 26 MMOL/L (ref 22–29)
COLOR UR: YELLOW
CREAT SERPL-MCNC: 0.77 MG/DL (ref 0.57–1)
DEPRECATED RDW RBC AUTO: 42.3 FL (ref 37–54)
EOSINOPHIL # BLD AUTO: 0.39 10*3/MM3 (ref 0–0.4)
EOSINOPHIL NFR BLD AUTO: 3.1 % (ref 0.3–6.2)
ERYTHROCYTE [DISTWIDTH] IN BLOOD BY AUTOMATED COUNT: 15.4 % (ref 12.3–15.4)
GFR SERPL CREATININE-BSD FRML MDRD: 81 ML/MIN/1.73
GLUCOSE SERPL-MCNC: 200 MG/DL (ref 65–99)
GLUCOSE UR STRIP-MCNC: ABNORMAL MG/DL
HCT VFR BLD AUTO: 38.3 % (ref 34–46.6)
HGB BLD-MCNC: 11.5 G/DL (ref 12–15.9)
HGB UR QL STRIP.AUTO: NEGATIVE
HYALINE CASTS UR QL AUTO: ABNORMAL /LPF
IMM GRANULOCYTES # BLD AUTO: 0.12 10*3/MM3 (ref 0–0.05)
IMM GRANULOCYTES NFR BLD AUTO: 1 % (ref 0–0.5)
KETONES UR QL STRIP: ABNORMAL
LEUKOCYTE ESTERASE UR QL STRIP.AUTO: ABNORMAL
LYMPHOCYTES # BLD AUTO: 2.98 10*3/MM3 (ref 0.7–3.1)
LYMPHOCYTES NFR BLD AUTO: 24 % (ref 19.6–45.3)
MCH RBC QN AUTO: 23.3 PG (ref 26.6–33)
MCHC RBC AUTO-ENTMCNC: 30 G/DL (ref 31.5–35.7)
MCV RBC AUTO: 77.5 FL (ref 79–97)
MONOCYTES # BLD AUTO: 0.76 10*3/MM3 (ref 0.1–0.9)
MONOCYTES NFR BLD AUTO: 6.1 % (ref 5–12)
NEUTROPHILS NFR BLD AUTO: 65.2 % (ref 42.7–76)
NEUTROPHILS NFR BLD AUTO: 8.08 10*3/MM3 (ref 1.7–7)
NITRITE UR QL STRIP: NEGATIVE
NRBC BLD AUTO-RTO: 0 /100 WBC (ref 0–0.2)
PH UR STRIP.AUTO: <=5 [PH] (ref 5–8)
PLATELET # BLD AUTO: 529 10*3/MM3 (ref 140–450)
PMV BLD AUTO: 10.6 FL (ref 6–12)
POTASSIUM SERPL-SCNC: 4.3 MMOL/L (ref 3.5–5.2)
PROT UR QL STRIP: NEGATIVE
RBC # BLD AUTO: 4.94 10*6/MM3 (ref 3.77–5.28)
RBC # UR: ABNORMAL /HPF
REF LAB TEST METHOD: ABNORMAL
SODIUM SERPL-SCNC: 138 MMOL/L (ref 136–145)
SP GR UR STRIP: 1.02 (ref 1–1.03)
SQUAMOUS #/AREA URNS HPF: ABNORMAL /HPF
UROBILINOGEN UR QL STRIP: ABNORMAL
WBC # BLD AUTO: 12.41 10*3/MM3 (ref 3.4–10.8)
WBC UR QL AUTO: ABNORMAL /HPF

## 2020-10-12 PROCEDURE — C9803 HOPD COVID-19 SPEC COLLECT: HCPCS | Performed by: PHYSICIAN ASSISTANT

## 2020-10-12 PROCEDURE — 81001 URINALYSIS AUTO W/SCOPE: CPT | Performed by: PHYSICIAN ASSISTANT

## 2020-10-12 PROCEDURE — 81025 URINE PREGNANCY TEST: CPT | Performed by: OBSTETRICS & GYNECOLOGY

## 2020-10-12 PROCEDURE — 85025 COMPLETE CBC W/AUTO DIFF WBC: CPT | Performed by: PHYSICIAN ASSISTANT

## 2020-10-12 PROCEDURE — 80048 BASIC METABOLIC PNL TOTAL CA: CPT | Performed by: OBSTETRICS & GYNECOLOGY

## 2020-10-12 PROCEDURE — 36415 COLL VENOUS BLD VENIPUNCTURE: CPT

## 2020-10-12 PROCEDURE — 87086 URINE CULTURE/COLONY COUNT: CPT | Performed by: PHYSICIAN ASSISTANT

## 2020-10-12 PROCEDURE — U0004 COV-19 TEST NON-CDC HGH THRU: HCPCS | Performed by: PHYSICIAN ASSISTANT

## 2020-10-12 NOTE — DISCHARGE INSTRUCTIONS
PAT PASS GIVEN/REVIEWED WITH PT.  VERBALIZED UNDERSTANDING OF THE FOLLOWING:  DO NOT EAT, DRINK, SMOKE, USE SMOKELESS TOBACCO OR CHEW GUM AFTER MIDNIGHT THE NIGHT BEFORE SURGERY.  THIS ALSO INCLUDES HARD CANDIES AND MINTS.    DO NOT SHAVE THE AREA TO BE OPERATED ON AT LEAST 48 HOURS PRIOR TO THE PROCEDURE.  DO NOT WEAR MAKE UP OR NAIL POLISH.  DO NOT LEAVE IN ANY PIERCING OR WEAR JEWELRY THE DAY OF SURGERY.      DO NOT USE ADHESIVES IF YOU WEAR DENTURES.    DO NOT WEAR EYE CONTACTS; BRING IN YOUR GLASSES.    ONLY TAKE MEDICATION THE MORNING OF YOUR PROCEDURE IF INSTRUCTED BY YOUR SURGEON WITH ENOUGH WATER TO SWALLOW THE MEDICATION.  IF YOUR SURGEON DID NOT SPECIFY WHICH MEDICATIONS TO TAKE, YOU WILL NEED TO CALL THEIR OFFICE FOR FURTHER INSTRUCTIONS AND DO AS THEY INSTRUCT.    LEAVE ANYTHING YOU CONSIDER VALUABLE AT HOME.    YOU WILL NEED TO ARRANGE FOR SOMEONE TO DRIVE YOU HOME AFTER SURGERY.  IT IS RECOMMENDED THAT YOU DO NOT DRIVE, WORK, DRINK ALCOHOL OR MAKE MAJOR DECISIONS FOR AT LEAST 24 HOURS AFTER YOUR PROCEDURE IS COMPLETE.      THE DAY OF YOUR PROCEDURE, BRING IN THE FOLLOWING IF APPLICABLE:   PICTURE ID AND INSURANCE/MEDICARE OR MEDICAID CARDS/ANY CO-PAY THAT MAY BE DUE   COPY OF ADVANCED DIRECTIVE/LIVING WILL/POWER OR    CPAP/BIPAP/INHALERS   SKIN PREP SHEET   YOUR PREADMISSION TESTING PASS (IF NOT A PHONE HISTORY)        PAT patient education COVID testing pre-op instructions reviewed with pt.  Verbalized understanding.      Chlorhexidine wipes along with instruction/verification sheet given to pt.  Instructed pt to apply stickers from chlorhexidine wipes to verification sheet once skin prep has been  completed, and [facility].  Pt. Verbalizes understanding.

## 2020-10-13 LAB
BACTERIA SPEC AEROBE CULT: NO GROWTH
SARS-COV-2 RNA RESP QL NAA+PROBE: NOT DETECTED

## 2020-10-13 PROCEDURE — S0260 H&P FOR SURGERY: HCPCS | Performed by: OBSTETRICS & GYNECOLOGY

## 2020-10-13 NOTE — H&P
KARI Henson  Debi Munoz  : 1975  MRN: [id number]  [facility]: [id number]    History and Physical    Subjective   Debi Munoz is a 45 y.o. year old  who present for surgery due to history of menometrorrhagia who has completed childbearing.  Patient also with left lower quadrant pain and left ovarian cyst which is been persistent.  Patient had a recent transvaginal ultrasound in the office on follow-up from the emergency room.  Her uterus was noted to be normal other than possible bifurcation of the endometrium at the fundus.  She was noted to have a persistent left ovarian cyst.  She continues to have left lower quadrant pain.  She is here for further evaluation and possible ablation.  She had an endometrial biopsy in the office which returned insufficient.  The patient understands the possibility she may not be able to have the ablation secondary to her uterus size and shape.    History  Past Medical History:   Diagnosis Date   • Anxiety    • Bell's palsy    • Bipolar disorder (CMS/[facility])    • Depression    • Diabetes mellitus (CMS/[facility]) 2020   • Disease of thyroid gland    • Gallstones    • Gestational diabetes    • H/O exercise stress test    • Hypertension    • Infection of kidney    • Migraine headache    • Ovarian cyst    • PONV (postoperative nausea and vomiting)    • Sleep apnea     CPAP,instructed to bring DOS.   • Wears glasses      No current facility-administered medications on file prior to encounter.      Current Outpatient Medications on File Prior to Encounter   Medication Sig Dispense Refill   • BYSTOLIC 10 MG tablet Take 1 tablet by mouth Daily. 30 tablet 0   • DULoxetine (CYMBALTA) 60 MG capsule duloxetine 60 mg capsule,delayed release   TK 1 C PO BID     • ibuprofen (ADVIL,MOTRIN) 800 MG tablet Take 1 tablet by mouth Every 8 (Eight) Hours As Needed for Mild Pain . 90 tablet 11   • levothyroxine (SYNTHROID, LEVOTHROID) 200 MCG tablet Take 1 tablet by mouth Daily. (Patient taking  differently: Take 175 mcg by mouth Daily.) 30 tablet 11   • lisinopril-hydrochlorothiazide (PRINZIDE,ZESTORETIC) 20-12.5 MG per tablet Take 1 tablet by mouth Daily.     • LORazepam (ATIVAN) 0.5 MG tablet Take 0.5 mg by mouth 2 (Two) Times a Day As Needed.  0   • SITagliptin-metFORMIN HCl ER (Janumet XR)  MG tablet Take 1 tablet by mouth 2 (two) times a day.     • temazepam (RESTORIL) 15 MG capsule Take 1 capsule by mouth At Night As Needed for Sleep. 30 capsule 2   • tiZANidine (ZANAFLEX) 4 MG tablet Take 4 mg by mouth At Night As Needed for Muscle Spasms.       No Known Allergies  Past Surgical History:   Procedure Laterality Date   • CARPAL TUNNEL RELEASE Bilateral    •  SECTION      times three   • CHOLECYSTECTOMY     • ELBOW ARTHROPLASTY Left    • SKIN BIOPSY       Family History   Problem Relation Age of Onset   • Arthritis Other    • Cancer Other    • Thyroid disease Other    • Diabetes Other    • Mental illness Other    • Heart attack Other    • Arthritis Mother    • Cancer Mother    • Thyroid disease Mother    • Diabetes Father    • Mental illness Father    • Mental illness Sister    • Heart attack Other    • Cancer Other    • Cancer Other    • Diabetes Daughter      Social History     Socioeconomic History   • Marital status:      Spouse name: Not on file   • Number of children: Not on file   • Years of education: Not on file   • Highest education level: Not on file   Social Needs   • Financial resource strain: Not hard at all   • Food insecurity     Worry: Never true     Inability: Never true   • Transportation needs     Medical: No     Non-medical: No   Tobacco Use   • Smoking status: Never Smoker   • Smokeless tobacco: Never Used   Substance and Sexual Activity   • Alcohol use: No   • Drug use: No   • Sexual activity: Yes     Partners: Male     Birth control/protection: None     Comment: vasectomy   Lifestyle   • Physical activity     Days per week: 0 days     Minutes per session: 0  min   • Stress: Only a little     Review of Systems  The following systems were reviewed and negative:  constitution, eyes, ENT, respiratory, cardiovascular, gastrointestinal, genitourinary, integument, breast, hematologic / lymphatic, musculoskeletal, neurological, behavioral/psych, endocrine and allergies / immunologic     Objective  Recent Vitals       9/3/2020 9/17/2020 9/24/2020       BP:  130/86  126/82  138/90     Weight:  110 kg (242 lb)  110 kg (241 lb 9.6 oz)  109 kg (240 lb 6.4 oz)     BMI (Calculated):  41.5  41.5  41         Physical Exam:  General Appearance: alert, appears stated age and cooperative  Head: normocephalic, without obvious abnormality and atraumatic  Eyes: lids and lashes normal, conjunctivae and sclerae normal, no icterus, no pallor and corneas clear  Lungs: clear to auscultation, respirations regular, respirations even and respirations unlabored  Heart: regular rhythm and normal rate, normal S1, S2, no murmur, gallop, or rubs and no click  Abdomen: normal bowel sounds, no masses, no hepatomegaly, no splenomegaly, soft non-tender, no guarding and no rebound tenderness  Extremities: moves extremities well, no edema, no cyanosis and no redness  Skin: no bleeding, bruising or rash and no lesions noted  Psych: normal mood and affect, oriented to person, time and place, thought content organized and appropriate judgment    Labs  Lab Results   Component Value Date     (H) 10/12/2020    HGB 11.5 (L) 10/12/2020    HCT 38.3 10/12/2020    WBC 12.41 (H) 10/12/2020     10/12/2020    K 4.3 10/12/2020     10/12/2020    CO2 26.0 10/12/2020    BUN 12 10/12/2020    CREATININE 0.77 10/12/2020    GLUCOSE 200 (H) 10/12/2020    ALBUMIN 4.40 08/14/2020    CALCIUM 10.1 10/12/2020    AST 57 (H) 08/14/2020    ALT 60 (H) 08/14/2020    BILITOT 0.4 08/14/2020      Lab Results   Component Value Date    SQUAMEPIUA 0-2 10/12/2020    SPECGRAVUR 1.025 10/12/2020    KETONESU Trace (A) 10/12/2020     BLOODU Negative 10/12/2020    LEUKOCYTESUR Trace (A) 10/12/2020    NITRITEU Negative 10/12/2020    RBCUA None Seen 10/12/2020    WBCUA 0-2 (A) 10/12/2020    BACTERIA 1+ (A) 10/12/2020        No results found for: URINECX     Assessment   1. Menometrorrhagia  2. Left lower quadrant pain  3. Left ovarian cyst     Plan   1. Dx laparoscopy with possible ovarian cystectomy, possible LSO, D&C, dx hysteroscopy with possible novasure ablation.  2. ABx and DVT prophylaxis as indicated.  3. Risks, complications, benefits, and other alternatives discussed.  4. All questions answered and pt in agreement with plan.    Liz Mendoza M.D.  10/13/2020  08:39 EDT

## 2020-10-14 ENCOUNTER — HOSPITAL ENCOUNTER (OUTPATIENT)
Facility: HOSPITAL | Age: 45
Setting detail: HOSPITAL OUTPATIENT SURGERY
Discharge: HOME | End: 2020-10-14
Attending: OBSTETRICS & GYNECOLOGY | Admitting: OBSTETRICS & GYNECOLOGY

## 2020-10-14 ENCOUNTER — ANESTHESIA EVENT (OUTPATIENT)
Dept: PERIOP | Facility: HOSPITAL | Age: 45
End: 2020-10-14

## 2020-10-14 ENCOUNTER — ANESTHESIA (OUTPATIENT)
Dept: PERIOP | Facility: HOSPITAL | Age: 45
End: 2020-10-14

## 2020-10-14 VITALS
HEART RATE: 73 BPM | DIASTOLIC BLOOD PRESSURE: 72 MMHG | RESPIRATION RATE: 18 BRPM | SYSTOLIC BLOOD PRESSURE: 127 MMHG | TEMPERATURE: 98 F | OXYGEN SATURATION: 97 %

## 2020-10-14 DIAGNOSIS — N83.202 LEFT OVARIAN CYST: ICD-10-CM

## 2020-10-14 DIAGNOSIS — N92.1 MENORRHAGIA WITH IRREGULAR CYCLE: ICD-10-CM

## 2020-10-14 LAB — GLUCOSE BLDC GLUCOMTR-MCNC: 143 MG/DL (ref 70–130)

## 2020-10-14 PROCEDURE — 25010000002 PROPOFOL 10 MG/ML EMULSION: Performed by: NURSE ANESTHETIST, CERTIFIED REGISTERED

## 2020-10-14 PROCEDURE — 25010000003 LIDOCAINE 1 % SOLUTION: Performed by: OBSTETRICS & GYNECOLOGY

## 2020-10-14 PROCEDURE — 82962 GLUCOSE BLOOD TEST: CPT

## 2020-10-14 PROCEDURE — 94799 UNLISTED PULMONARY SVC/PX: CPT

## 2020-10-14 PROCEDURE — 25010000002 ONDANSETRON PER 1 MG: Performed by: NURSE ANESTHETIST, CERTIFIED REGISTERED

## 2020-10-14 PROCEDURE — 58558 HYSTEROSCOPY BIOPSY: CPT | Performed by: OBSTETRICS & GYNECOLOGY

## 2020-10-14 PROCEDURE — 25010000002 KETOROLAC TROMETHAMINE PER 15 MG: Performed by: NURSE ANESTHETIST, CERTIFIED REGISTERED

## 2020-10-14 RX ORDER — ONDANSETRON 2 MG/ML
INJECTION INTRAMUSCULAR; INTRAVENOUS AS NEEDED
Status: DISCONTINUED | OUTPATIENT
Start: 2020-10-14 | End: 2020-10-14 | Stop reason: SURG

## 2020-10-14 RX ORDER — ACETAMINOPHEN 500 MG
1000 TABLET ORAL EVERY 8 HOURS PRN
Qty: 60 TABLET | Refills: 0 | Status: SHIPPED | OUTPATIENT
Start: 2020-10-14 | End: 2023-02-01

## 2020-10-14 RX ORDER — SODIUM CHLORIDE 0.9 % (FLUSH) 0.9 %
10 SYRINGE (ML) INJECTION AS NEEDED
Status: DISCONTINUED | OUTPATIENT
Start: 2020-10-14 | End: 2020-10-14 | Stop reason: HOSPADM

## 2020-10-14 RX ORDER — IBUPROFEN 800 MG/1
800 TABLET ORAL EVERY 8 HOURS PRN
Qty: 30 TABLET | Refills: 0 | Status: SHIPPED | OUTPATIENT
Start: 2020-10-14 | End: 2021-03-02

## 2020-10-14 RX ORDER — KETOROLAC TROMETHAMINE 30 MG/ML
INJECTION, SOLUTION INTRAMUSCULAR; INTRAVENOUS AS NEEDED
Status: DISCONTINUED | OUTPATIENT
Start: 2020-10-14 | End: 2020-10-14 | Stop reason: SURG

## 2020-10-14 RX ORDER — ONDANSETRON 4 MG/1
4 TABLET, FILM COATED ORAL ONCE AS NEEDED
Status: DISCONTINUED | OUTPATIENT
Start: 2020-10-14 | End: 2020-10-14 | Stop reason: HOSPADM

## 2020-10-14 RX ORDER — LIDOCAINE HYDROCHLORIDE 10 MG/ML
INJECTION, SOLUTION INFILTRATION; PERINEURAL AS NEEDED
Status: DISCONTINUED | OUTPATIENT
Start: 2020-10-14 | End: 2020-10-14 | Stop reason: HOSPADM

## 2020-10-14 RX ORDER — SODIUM CHLORIDE, SODIUM LACTATE, POTASSIUM CHLORIDE, CALCIUM CHLORIDE 600; 310; 30; 20 MG/100ML; MG/100ML; MG/100ML; MG/100ML
1000 INJECTION, SOLUTION INTRAVENOUS CONTINUOUS
Status: DISCONTINUED | OUTPATIENT
Start: 2020-10-14 | End: 2020-10-14 | Stop reason: HOSPADM

## 2020-10-14 RX ORDER — PROPOFOL 10 MG/ML
VIAL (ML) INTRAVENOUS AS NEEDED
Status: DISCONTINUED | OUTPATIENT
Start: 2020-10-14 | End: 2020-10-14 | Stop reason: SURG

## 2020-10-14 RX ORDER — ONDANSETRON 2 MG/ML
4 INJECTION INTRAMUSCULAR; INTRAVENOUS ONCE AS NEEDED
Status: DISCONTINUED | OUTPATIENT
Start: 2020-10-14 | End: 2020-10-14 | Stop reason: HOSPADM

## 2020-10-14 RX ORDER — PROMETHAZINE HYDROCHLORIDE 12.5 MG/1
12.5 TABLET ORAL ONCE AS NEEDED
Status: DISCONTINUED | OUTPATIENT
Start: 2020-10-14 | End: 2020-10-14 | Stop reason: HOSPADM

## 2020-10-14 RX ORDER — SODIUM CHLORIDE 9 MG/ML
INJECTION, SOLUTION INTRAVENOUS CONTINUOUS PRN
Status: COMPLETED | OUTPATIENT
Start: 2020-10-14 | End: 2020-10-14

## 2020-10-14 RX ORDER — KETAMINE HYDROCHLORIDE 50 MG/ML
INJECTION, SOLUTION, CONCENTRATE INTRAMUSCULAR; INTRAVENOUS AS NEEDED
Status: DISCONTINUED | OUTPATIENT
Start: 2020-10-14 | End: 2020-10-14 | Stop reason: SURG

## 2020-10-14 RX ORDER — ONDANSETRON HYDROCHLORIDE 8 MG/1
8 TABLET, FILM COATED ORAL EVERY 8 HOURS PRN
Qty: 10 TABLET | Refills: 0 | Status: SHIPPED | OUTPATIENT
Start: 2020-10-14 | End: 2020-10-23

## 2020-10-14 RX ORDER — SODIUM CHLORIDE 0.9 % (FLUSH) 0.9 %
3 SYRINGE (ML) INJECTION EVERY 12 HOURS SCHEDULED
Status: DISCONTINUED | OUTPATIENT
Start: 2020-10-14 | End: 2020-10-14 | Stop reason: HOSPADM

## 2020-10-14 RX ORDER — SCOLOPAMINE TRANSDERMAL SYSTEM 1 MG/1
1 PATCH, EXTENDED RELEASE TRANSDERMAL ONCE
Status: DISCONTINUED | OUTPATIENT
Start: 2020-10-14 | End: 2020-10-14 | Stop reason: HOSPADM

## 2020-10-14 RX ADMIN — SODIUM CHLORIDE, POTASSIUM CHLORIDE, SODIUM LACTATE AND CALCIUM CHLORIDE 1000 ML: 600; 310; 30; 20 INJECTION, SOLUTION INTRAVENOUS at 09:05

## 2020-10-14 RX ADMIN — KETOROLAC TROMETHAMINE 30 MG: 30 INJECTION, SOLUTION INTRAMUSCULAR at 10:30

## 2020-10-14 RX ADMIN — KETAMINE HYDROCHLORIDE 20 MG: 50 INJECTION, SOLUTION INTRAMUSCULAR; INTRAVENOUS at 10:28

## 2020-10-14 RX ADMIN — PROPOFOL 100 MG: 10 INJECTION, EMULSION INTRAVENOUS at 10:28

## 2020-10-14 RX ADMIN — SCOPALAMINE 1 PATCH: 1 PATCH, EXTENDED RELEASE TRANSDERMAL at 10:21

## 2020-10-14 RX ADMIN — ONDANSETRON 4 MG: 2 INJECTION INTRAMUSCULAR; INTRAVENOUS at 10:30

## 2020-10-14 RX ADMIN — PROPOFOL 160 MCG/KG/MIN: 10 INJECTION, EMULSION INTRAVENOUS at 10:28

## 2020-10-14 NOTE — OP NOTE
BH Sixto Munoz  : 1975  MRN: [id number]  [facility]: [id number]  Date: 10/14/2020    Operative Report    Pre-op Diagnosis:  Menorrhagia with irregular cycle [N92.1]  Left ovarian cyst [N83.202]   Post-op Diagnosis:  Post-Op Diagnosis Codes:     * Menorrhagia with irregular cycle [N92.1]     * Left ovarian cyst [N83.202]   Procedure: Procedure(s):  DILATATION AND CURETTAGE HYSTEROSCOPY   Surgeon: Liz Mendoza M.D.   Assist: None     Anesthesia: IV sedation with 1% lidocaine paracervical block   Estimated Blood Loss: 5 mls   ABx: none   Specimens:  endometrial   Findings: Uterus markedly retroverted; endometrium narrow and atrophic in appearance; uterine cavity arcuate and unable to perform uterine ablation given size as well as shape.   Complications: none   Indications:  See H&P         Description of Procedure:  After the appropriate time out and adequate dosing of her anesthesia, the patient had been prepped and draped in the usual sterile fashion.  She was placed in the dorsal lithotomy position using Dinesh stirrups.  The bladder had been drained with a red rubber catheter per nursing.  A weighted speculum was placed in the vagina.  The anterior lip of the cervix was grasped with a single-tooth tenaculum.  The cervix was injected at the 3 o'clock and 9 o'clock position with 1% lidocaine plain without any complications.  The cervix was then progressively dilated using Cuadra dilators.  The uterine cavity was sounded to 4 cm.  Rigid hysteroscopy was then performed with the above findings noted.  Sharp curettings were then obtained with a good cry throughout with tissue retrieved and sent for pathologic specimen.  The cervical tenaculum was removed and the cervix was noted to be hemostatic after the application of 2-0 chromic suture in a figure-of-eight fashion.  The laparoscopy was not performed as noted per plan on H&P.    All instrument and sponge counts were correct at the end of the procedure.   The patient tolerated the procedure well.  There were no complications.  She was taken to the postoperative recovery room in stable condition.    Liz Mendoza M.D.  10/14/2020  11:11 EDT

## 2020-10-14 NOTE — ANESTHESIA POSTPROCEDURE EVALUATION
Patient: Debi Munoz    Procedure Summary     Date: 10/14/20 Room / Location: [facility] OR 2 /  [facility]    Anesthesia Start: 1025 Anesthesia Stop:     Procedure: DILATATION AND CURETTAGE HYSTEROSCOPY (Left Vagina) Diagnosis:       Menorrhagia with irregular cycle      Left ovarian cyst      (Menorrhagia with irregular cycle [N92.1])      (Left ovarian cyst [N83.202])    Surgeon: Liz Mendoza MD Provider: Mo Sanchez CRNA    Anesthesia Type: MAC ASA Status: 3          Anesthesia Type: MAC    Vitals  HR 80  Sat 97  /76  Resp 13  Temp 97.9        Post Anesthesia Care and Evaluation    Patient location during evaluation: bedside  Patient participation: complete - patient participated  Level of consciousness: awake and alert and sleepy but conscious  Pain score: 0  Pain management: adequate  Airway patency: patent  Anesthetic complications: No anesthetic complications  PONV Status: none  Cardiovascular status: acceptable  Respiratory status: acceptable  Hydration status: acceptable      
no

## 2020-10-14 NOTE — ANESTHESIA PREPROCEDURE EVALUATION
Anesthesia Evaluation     Patient summary reviewed and Nursing notes reviewed   history of anesthetic complications: PONV  NPO Solid Status: > 8 hours  NPO Liquid Status: > 8 hours           Airway   Mallampati: II  TM distance: <3 FB  Neck ROM: full  Possible difficult intubation and Large neck circumference  Dental - normal exam     Pulmonary - normal exam   (+) shortness of breath, sleep apnea,   Cardiovascular - normal exam    Rhythm: regular  Rate: normal    (+) hypertension, hyperlipidemia,       Neuro/Psych  (+) headaches, psychiatric history Anxiety, Depression and Bipolar,     GI/Hepatic/Renal/Endo    (+) morbid obesity, GERD,  hepatitis, liver disease, diabetes mellitus, thyroid problem hypothyroidism    Musculoskeletal     Abdominal   (+) obese,     Abdomen: soft.  Bowel sounds: normal.   Substance History      OB/GYN          Other                        Anesthesia Plan    ASA 3     MAC   (Risks and benefits discussed including risk of aspiration, recall and dental damage. All patient questions answered. Will continue with POC.)  intravenous induction     Anesthetic plan, all risks, benefits, and alternatives have been provided, discussed and informed consent has been obtained with: patient.

## 2020-10-14 NOTE — INTERVAL H&P NOTE
H&P updated. The patient was examined and there are no changes to the H&P other than discussed ultrasound findings with patient.  Discussed with patient the possibility of not being able to perform the uterine ablation given her uterine cavity.  Pt is desiring hysterectomy if unable to perform ablation.  If unable to perform uterine ablation then will hold on Dx laparoscopy with removal of ovarian cyst as she desires to schedule hysterectomy.  I discussed the risks, benefits, complications, and other alternatives as noted.

## 2020-10-18 LAB
LAB AP CASE REPORT: NORMAL
PATH REPORT.FINAL DX SPEC: NORMAL

## 2020-10-23 ENCOUNTER — OFFICE VISIT (OUTPATIENT)
Dept: OBSTETRICS AND GYNECOLOGY | Facility: CLINIC | Age: 45
End: 2020-10-23

## 2020-10-23 VITALS
DIASTOLIC BLOOD PRESSURE: 68 MMHG | HEIGHT: 64 IN | SYSTOLIC BLOOD PRESSURE: 122 MMHG | BODY MASS INDEX: 40.46 KG/M2 | WEIGHT: 237 LBS

## 2020-10-23 DIAGNOSIS — Z09 POSTOPERATIVE FOLLOW-UP: Primary | ICD-10-CM

## 2020-10-23 PROCEDURE — 99212 OFFICE O/P EST SF 10 MIN: CPT | Performed by: PHYSICIAN ASSISTANT

## 2020-10-23 RX ORDER — BLOOD SUGAR DIAGNOSTIC
STRIP MISCELLANEOUS DAILY
COMMUNITY
Start: 2020-09-14

## 2020-10-23 RX ORDER — LEVOTHYROXINE SODIUM 137 UG/1
137 TABLET ORAL DAILY
COMMUNITY
Start: 2020-09-28 | End: 2021-03-07 | Stop reason: HOSPADM

## 2020-10-23 RX ORDER — LISINOPRIL AND HYDROCHLOROTHIAZIDE 25; 20 MG/1; MG/1
TABLET ORAL DAILY
COMMUNITY
Start: 2020-09-30 | End: 2021-03-02

## 2020-10-23 NOTE — PROGRESS NOTES
Subjective   Chief Complaint   Patient presents with   • Post-op     9 days post op D&C Hysteroscopy. Patient c/o very tired, no energy       Debi Munoz is a 45 y.o. year old  presenting to be seen for postop visit.  She is 9 days postop D&C hysteroscopy for menorrhagia.  An endometrial ablation could not be done due to the patient's narrow endometrial cavity and an arcuate uterus.  Pathology benign  She reports that she had a 5-day bleed that occurred 2 days after the D&C.  She has not had any bleeding for the last 4 to 5 days.  Her most recent CBC 11 days ago noted a hemoglobin of 11.5.  States that Dr. Pollard told her that if bleeding continued that she might proceed with a hysterectomy as endometrial ablation could not be done.  The patient's preoperative vaginal ultrasound also noted the patient had a 3.6 simple cyst on her left ovary and we will plan on doing a follow-up ultrasound in about 5 weeks to check the cyst.  She has an appointment next week with her PCP Dr. Santacruz. She declines repeat CBC today    Past Medical History:   Diagnosis Date   • Anxiety    • Bell's palsy    • Bipolar disorder (CMS/[facility])    • Depression    • Diabetes mellitus (CMS/[facility]) 2020   • Disease of thyroid gland    • Gallstones    • Gestational diabetes    • H/O exercise stress test    • Hypertension    • Infection of kidney    • Migraine headache    • Ovarian cyst    • PONV (postoperative nausea and vomiting)    • Sleep apnea     CPAP,instructed to bring DOS.   • Wears glasses         Current Outpatient Medications:   •  acetaminophen (TYLENOL) 500 MG tablet, Take 2 tablets by mouth Every 8 (Eight) Hours As Needed for Mild or Moderate Pain ., Disp: 60 tablet, Rfl: 0  •  BYSTOLIC 10 MG tablet, Take 1 tablet by mouth Daily., Disp: 30 tablet, Rfl: 0  •  Cholecalciferol (VITAMIN D3 PO), Take 2,000 Units by mouth 1 (One) Time Per Week., Disp: , Rfl:   •  DULoxetine (CYMBALTA) 60 MG capsule, duloxetine 60 mg capsule,delayed  release  TK 1 C PO BID, Disp: , Rfl:   •  ibuprofen (ADVIL,MOTRIN) 800 MG tablet, Take 1 tablet by mouth Every 8 (Eight) Hours As Needed for Mild Pain ., Disp: 90 tablet, Rfl: 11  •  ibuprofen (ADVIL,MOTRIN) 800 MG tablet, Take 1 tablet by mouth Every 8 (Eight) Hours As Needed for Mild Pain ., Disp: 30 tablet, Rfl: 0  •  levothyroxine (SYNTHROID, LEVOTHROID) 175 MCG tablet, Take  by mouth Daily., Disp: , Rfl:   •  lisinopril-hydrochlorothiazide (PRINZIDE,ZESTORETIC) 20-12.5 MG per tablet, Take 1 tablet by mouth Daily., Disp: , Rfl:   •  lisinopril-hydrochlorothiazide (PRINZIDE,ZESTORETIC) 20-25 MG per tablet, Take  by mouth Daily., Disp: , Rfl:   •  LORazepam (ATIVAN) 0.5 MG tablet, Take 0.5 mg by mouth 2 (Two) Times a Day As Needed., Disp: , Rfl: 0  •  temazepam (RESTORIL) 15 MG capsule, Take 1 capsule by mouth At Night As Needed for Sleep., Disp: 30 capsule, Rfl: 2  •  tiZANidine (ZANAFLEX) 4 MG tablet, Take 4 mg by mouth At Night As Needed for Muscle Spasms., Disp: , Rfl:   •  Accu-Chek Guide test strip, Daily., Disp: , Rfl:   •  levothyroxine (SYNTHROID, LEVOTHROID) 200 MCG tablet, Take 1 tablet by mouth Daily. (Patient taking differently: Take 175 mcg by mouth Daily.), Disp: 30 tablet, Rfl: 11   No Known Allergies   Past Surgical History:   Procedure Laterality Date   • CARPAL TUNNEL RELEASE Bilateral    •  SECTION      times three   • CHOLECYSTECTOMY     • D&C HYSTEROSCOPY ENDOMETRIAL ABLATION Left 10/14/2020    Procedure: DILATATION AND CURETTAGE HYSTEROSCOPY;  Surgeon: Liz Mendoza MD;  Location: [facility];  Service: Obstetrics/Gynecology;  Laterality: Left;   • ELBOW ARTHROPLASTY Left    • SKIN BIOPSY        Social History     Socioeconomic History   • Marital status:      Spouse name: Not on file   • Number of children: Not on file   • Years of education: Not on file   • Highest education level: Not on file   Social Needs   • Financial resource strain: Not hard at all   • Food insecurity     " Worry: Never true     Inability: Never true   • Transportation needs     Medical: No     Non-medical: No   Tobacco Use   • Smoking status: Never Smoker   • Smokeless tobacco: Never Used   Substance and Sexual Activity   • Alcohol use: No   • Drug use: No   • Sexual activity: Yes     Partners: Male     Birth control/protection: None     Comment: vasectomy   Lifestyle   • Physical activity     Days per week: 0 days     Minutes per session: 0 min   • Stress: Only a little      Family History   Problem Relation Age of Onset   • Arthritis Other    • Cancer Other    • Thyroid disease Other    • Diabetes Other    • Mental illness Other    • Heart attack Other    • Arthritis Mother    • Cancer Mother    • Thyroid disease Mother    • Diabetes Father    • Mental illness Father    • Mental illness Sister    • Heart attack Other    • Cancer Other    • Cancer Other    • Diabetes Daughter        Review of Systems   Constitutional: Positive for fatigue. Negative for chills, diaphoresis and fever.   Gastrointestinal: Negative for abdominal pain, nausea and vomiting.   Genitourinary: Positive for menstrual problem. Negative for difficulty urinating, dysuria, pelvic pain, vaginal bleeding and vaginal discharge.           Objective   /68   Ht 162.6 cm (64\")   Wt 108 kg (237 lb)   LMP  (LMP Unknown)   BMI 40.68 kg/m²     Physical Exam         Assessment and Plan  Diagnoses and all orders for this visit:    1. Postoperative follow-up (Primary)      There are no Patient Instructions on file for this visit.           This note was electronically signed.    Jessica Webb PA-C   October 23, 2020  "

## 2020-10-23 NOTE — PATIENT INSTRUCTIONS
We will follow-up in 5 weeks for repeat ultrasound to check ovarian cyst.  Also reviewed bleeding.  If she continues with menorrhagia will discuss proceeding with hysterectomy.

## 2020-11-10 ENCOUNTER — PREP FOR SURGERY (OUTPATIENT)
Dept: OTHER | Facility: HOSPITAL | Age: 45
End: 2020-11-10

## 2020-11-10 DIAGNOSIS — N92.1 MENORRHAGIA WITH IRREGULAR CYCLE: Primary | ICD-10-CM

## 2020-11-10 PROBLEM — N92.0 MENORRHAGIA: Status: ACTIVE | Noted: 2020-11-10

## 2020-11-10 RX ORDER — SODIUM CHLORIDE 0.9 % (FLUSH) 0.9 %
10 SYRINGE (ML) INJECTION AS NEEDED
Status: CANCELLED | OUTPATIENT
Start: 2021-03-05

## 2020-11-10 RX ORDER — SODIUM CHLORIDE 0.9 % (FLUSH) 0.9 %
3 SYRINGE (ML) INJECTION EVERY 12 HOURS SCHEDULED
Status: CANCELLED | OUTPATIENT
Start: 2021-03-05

## 2020-11-10 RX ORDER — CEFAZOLIN SODIUM 2 G/50ML
2 SOLUTION INTRAVENOUS ONCE
Status: CANCELLED | OUTPATIENT
Start: 2021-03-05 | End: 2020-11-10

## 2020-11-10 RX ORDER — PHENAZOPYRIDINE HYDROCHLORIDE 100 MG/1
200 TABLET, FILM COATED ORAL ONCE
Status: CANCELLED | OUTPATIENT
Start: 2021-03-05 | End: 2020-11-10

## 2020-12-17 ENCOUNTER — APPOINTMENT (OUTPATIENT)
Dept: PREADMISSION TESTING | Facility: HOSPITAL | Age: 45
End: 2020-12-17

## 2020-12-22 ENCOUNTER — OFFICE VISIT (OUTPATIENT)
Dept: PULMONOLOGY | Facility: CLINIC | Age: 45
End: 2020-12-22

## 2020-12-22 VITALS
RESPIRATION RATE: 16 BRPM | BODY MASS INDEX: 41.15 KG/M2 | OXYGEN SATURATION: 98 % | HEIGHT: 64 IN | DIASTOLIC BLOOD PRESSURE: 76 MMHG | HEART RATE: 104 BPM | SYSTOLIC BLOOD PRESSURE: 124 MMHG | WEIGHT: 241 LBS

## 2020-12-22 DIAGNOSIS — G47.33 OSA (OBSTRUCTIVE SLEEP APNEA): Primary | ICD-10-CM

## 2020-12-22 DIAGNOSIS — G47.00 INSOMNIA, UNSPECIFIED TYPE: ICD-10-CM

## 2020-12-22 DIAGNOSIS — E66.01 MORBID OBESITY, UNSPECIFIED OBESITY TYPE (HCC): ICD-10-CM

## 2020-12-22 PROCEDURE — 99214 OFFICE O/P EST MOD 30 MIN: CPT | Performed by: INTERNAL MEDICINE

## 2020-12-22 RX ORDER — RAMELTEON 8 MG/1
8 TABLET ORAL NIGHTLY
Qty: 30 TABLET | Refills: 3 | Status: SHIPPED | OUTPATIENT
Start: 2020-12-22 | End: 2021-03-16

## 2020-12-22 RX ORDER — SITAGLIPTIN AND METFORMIN HYDROCHLORIDE 1000; 50 MG/1; MG/1
1 TABLET, FILM COATED, EXTENDED RELEASE ORAL 2 TIMES DAILY
COMMUNITY
Start: 2020-11-12

## 2020-12-22 RX ORDER — TEMAZEPAM 15 MG/1
15 CAPSULE ORAL NIGHTLY PRN
Qty: 30 CAPSULE | Refills: 3 | Status: SHIPPED | OUTPATIENT
Start: 2020-12-22 | End: 2021-07-27 | Stop reason: SDUPTHER

## 2020-12-29 ENCOUNTER — TELEPHONE (OUTPATIENT)
Dept: PULMONOLOGY | Facility: CLINIC | Age: 45
End: 2020-12-29

## 2021-03-02 ENCOUNTER — APPOINTMENT (OUTPATIENT)
Dept: PREADMISSION TESTING | Facility: HOSPITAL | Age: 46
End: 2021-03-02

## 2021-03-02 VITALS — HEIGHT: 64 IN | BODY MASS INDEX: 40.19 KG/M2 | WEIGHT: 235.4 LBS

## 2021-03-02 DIAGNOSIS — N92.1 MENORRHAGIA WITH IRREGULAR CYCLE: ICD-10-CM

## 2021-03-02 LAB
ABO GROUP BLD: NORMAL
AMORPH URATE CRY URNS QL MICRO: ABNORMAL /HPF
ANION GAP SERPL CALCULATED.3IONS-SCNC: 13.9 MMOL/L (ref 5–15)
ANISOCYTOSIS BLD QL: NORMAL
B-HCG UR QL: NEGATIVE
BACTERIA UR QL AUTO: ABNORMAL /HPF
BASOPHILS # BLD AUTO: 0.07 10*3/MM3 (ref 0–0.2)
BASOPHILS NFR BLD AUTO: 0.7 % (ref 0–1.5)
BILIRUB UR QL STRIP: NEGATIVE
BUN SERPL-MCNC: 16 MG/DL (ref 6–20)
BUN/CREAT SERPL: 16.7 (ref 7–25)
CALCIUM SPEC-SCNC: 9 MG/DL (ref 8.6–10.5)
CHLORIDE SERPL-SCNC: 102 MMOL/L (ref 98–107)
CLARITY UR: ABNORMAL
CO2 SERPL-SCNC: 25.1 MMOL/L (ref 22–29)
COLOR UR: ABNORMAL
CREAT SERPL-MCNC: 0.96 MG/DL (ref 0.57–1)
DEPRECATED RDW RBC AUTO: 45.1 FL (ref 37–54)
EOSINOPHIL # BLD AUTO: 0.5 10*3/MM3 (ref 0–0.4)
EOSINOPHIL NFR BLD AUTO: 5.3 % (ref 0.3–6.2)
ERYTHROCYTE [DISTWIDTH] IN BLOOD BY AUTOMATED COUNT: 17.5 % (ref 12.3–15.4)
GFR SERPL CREATININE-BSD FRML MDRD: 63 ML/MIN/1.73
GLUCOSE SERPL-MCNC: 122 MG/DL (ref 65–99)
GLUCOSE UR STRIP-MCNC: NEGATIVE MG/DL
HCT VFR BLD AUTO: 36.3 % (ref 34–46.6)
HGB BLD-MCNC: 10.6 G/DL (ref 12–15.9)
HGB UR QL STRIP.AUTO: ABNORMAL
HYALINE CASTS UR QL AUTO: ABNORMAL /LPF
HYPOCHROMIA BLD QL: NORMAL
IMM GRANULOCYTES # BLD AUTO: 0.05 10*3/MM3 (ref 0–0.05)
IMM GRANULOCYTES NFR BLD AUTO: 0.5 % (ref 0–0.5)
KETONES UR QL STRIP: ABNORMAL
LEUKOCYTE ESTERASE UR QL STRIP.AUTO: NEGATIVE
LYMPHOCYTES # BLD AUTO: 2.36 10*3/MM3 (ref 0.7–3.1)
LYMPHOCYTES NFR BLD AUTO: 24.9 % (ref 19.6–45.3)
MCH RBC QN AUTO: 21.5 PG (ref 26.6–33)
MCHC RBC AUTO-ENTMCNC: 29.2 G/DL (ref 31.5–35.7)
MCV RBC AUTO: 73.6 FL (ref 79–97)
MICROCYTES BLD QL: NORMAL
MONOCYTES # BLD AUTO: 0.55 10*3/MM3 (ref 0.1–0.9)
MONOCYTES NFR BLD AUTO: 5.8 % (ref 5–12)
NEUTROPHILS NFR BLD AUTO: 5.94 10*3/MM3 (ref 1.7–7)
NEUTROPHILS NFR BLD AUTO: 62.8 % (ref 42.7–76)
NITRITE UR QL STRIP: NEGATIVE
NRBC BLD AUTO-RTO: 0 /100 WBC (ref 0–0.2)
PH UR STRIP.AUTO: 5.5 [PH] (ref 5–8)
PLATELET # BLD AUTO: 493 10*3/MM3 (ref 140–450)
PMV BLD AUTO: 10.7 FL (ref 6–12)
POTASSIUM SERPL-SCNC: 3.4 MMOL/L (ref 3.5–5.2)
PROT UR QL STRIP: ABNORMAL
RBC # BLD AUTO: 4.93 10*6/MM3 (ref 3.77–5.28)
RBC # UR: ABNORMAL /HPF
REF LAB TEST METHOD: ABNORMAL
RH BLD: NEGATIVE
SMALL PLATELETS BLD QL SMEAR: NORMAL
SODIUM SERPL-SCNC: 141 MMOL/L (ref 136–145)
SP GR UR STRIP: >=1.03 (ref 1–1.03)
SQUAMOUS #/AREA URNS HPF: ABNORMAL /HPF
UROBILINOGEN UR QL STRIP: ABNORMAL
WBC # BLD AUTO: 9.47 10*3/MM3 (ref 3.4–10.8)
WBC MORPH BLD: NORMAL
WBC UR QL AUTO: ABNORMAL /HPF

## 2021-03-02 PROCEDURE — 80048 BASIC METABOLIC PNL TOTAL CA: CPT

## 2021-03-02 PROCEDURE — 85007 BL SMEAR W/DIFF WBC COUNT: CPT

## 2021-03-02 PROCEDURE — 86901 BLOOD TYPING SEROLOGIC RH(D): CPT

## 2021-03-02 PROCEDURE — C9803 HOPD COVID-19 SPEC COLLECT: HCPCS | Performed by: OBSTETRICS & GYNECOLOGY

## 2021-03-02 PROCEDURE — U0004 COV-19 TEST NON-CDC HGH THRU: HCPCS | Performed by: OBSTETRICS & GYNECOLOGY

## 2021-03-02 PROCEDURE — 81025 URINE PREGNANCY TEST: CPT

## 2021-03-02 PROCEDURE — 81001 URINALYSIS AUTO W/SCOPE: CPT

## 2021-03-02 PROCEDURE — 36415 COLL VENOUS BLD VENIPUNCTURE: CPT

## 2021-03-02 PROCEDURE — 85025 COMPLETE CBC W/AUTO DIFF WBC: CPT

## 2021-03-02 PROCEDURE — 86900 BLOOD TYPING SEROLOGIC ABO: CPT

## 2021-03-02 RX ORDER — HYDROCHLOROTHIAZIDE 25 MG/1
25 TABLET ORAL DAILY
COMMUNITY

## 2021-03-02 RX ORDER — LISINOPRIL 30 MG/1
30 TABLET ORAL DAILY
COMMUNITY

## 2021-03-02 NOTE — DISCHARGE INSTRUCTIONS
PAT PASS GIVEN/REVIEWED WITH PT.  VERBALIZED UNDERSTANDING OF THE FOLLOWING:  DO NOT EAT, DRINK, SMOKE, USE SMOKELESS TOBACCO OR CHEW GUM AFTER MIDNIGHT THE NIGHT BEFORE SURGERY.  THIS ALSO INCLUDES HARD CANDIES AND MINTS.    DO NOT SHAVE THE AREA TO BE OPERATED ON AT LEAST 48 HOURS PRIOR TO THE PROCEDURE.  DO NOT WEAR MAKE UP OR NAIL POLISH.  DO NOT LEAVE IN ANY PIERCING OR WEAR JEWELRY THE DAY OF SURGERY.      DO NOT USE ADHESIVES IF YOU WEAR DENTURES.    DO NOT WEAR EYE CONTACTS; BRING IN YOUR GLASSES.    ONLY TAKE MEDICATION THE MORNING OF YOUR PROCEDURE IF INSTRUCTED BY YOUR SURGEON WITH ENOUGH WATER TO SWALLOW THE MEDICATION.  IF YOUR SURGEON DID NOT SPECIFY WHICH MEDICATIONS TO TAKE, YOU WILL NEED TO CALL THEIR OFFICE FOR FURTHER INSTRUCTIONS AND DO AS THEY INSTRUCT.    LEAVE ANYTHING YOU CONSIDER VALUABLE AT HOME.    YOU WILL NEED TO ARRANGE FOR SOMEONE TO DRIVE YOU HOME AFTER SURGERY.  IT IS RECOMMENDED THAT YOU DO NOT DRIVE, WORK, DRINK ALCOHOL OR MAKE MAJOR DECISIONS FOR AT LEAST 24 HOURS AFTER YOUR PROCEDURE IS COMPLETE.      THE DAY OF YOUR PROCEDURE, BRING IN THE FOLLOWING IF APPLICABLE:   PICTURE ID AND INSURANCE/MEDICARE OR MEDICAID CARDS/ANY CO-PAY THAT MAY BE DUE   COPY OF ADVANCED DIRECTIVE/LIVING WILL/POWER OR    CPAP/BIPAP/INHALERS   SKIN PREP SHEET   YOUR PREADMISSION TESTING PASS (IF NOT A PHONE HISTORY)            COVID self-quarantine instructions reviewed with the pt.  Verbalized understanding.    Chlorhexidine wipes along with instruction/verification sheet given to pt.  Instructed pt to date, time, and initial the verification sheet once skin prep has been  completed, and to return [facility] the day of the procedure.  Pt. Verbalizes understanding.      GYNECOLOGICAL PREP:    1). Soap Suds Enema (as instructed) the night before surgery.    2). Purchase two medicated douches. Use one douche the night before the procedure     and the other on the morning of your  surgery.    Schedule:    On 3-4-21, the evening before the procedure, do the following:    Soap Suds Enema  1 Medicated Douche  Shower  Use 1 package of chlorhexidine wipes, 1 hour after the shower.      On 3-5-21, the morning of the procedure, do the followin Medicated Douche  Use 1 package of chlorhexidine wipes.

## 2021-03-03 LAB — SARS-COV-2 RNA RESP QL NAA+PROBE: NOT DETECTED

## 2021-03-04 PROCEDURE — S0260 H&P FOR SURGERY: HCPCS | Performed by: OBSTETRICS & GYNECOLOGY

## 2021-03-04 NOTE — H&P
KARI Henson  Debi Munoz  : 1975  MRN: [id number]  [facility]: [id number]    History and Physical    Subjective   Debi Munoz is a 45 y.o. year old  who present for surgery due to long history of mental menorrhagia.  Patient had a D&C with diagnostic hysteroscopy in October of last year.  The patient was unable to have an endometrial biopsy secondary to the width of her endometrium.  The patient has continued to have menorrhagia.  She is requesting definitive treatment with a hysterectomy and BSO.  The patient has had 3 previous  sections.  She is also had ovarian cyst in the past as well.  Patient is here for a laparoscopic-assisted vaginal hysterectomy and bilateral salpingo-oophorectomy.    History  Past Medical History:   Diagnosis Date   • Anxiety    • Bell's palsy    • Bipolar disorder (CMS/[facility])    • COVID-19 2021   • Depression    • Diabetes mellitus (CMS/[facility]) 2020   • Diarrhea    • Disease of thyroid gland    • Elevated cholesterol    • Fibromyalgia    • Gallstones    • Gestational diabetes    • H/O exercise stress test    • Hypertension    • Infection of kidney    • Migraine headache    • JC (nonalcoholic steatohepatitis)    • Osteoarthritis    • Ovarian cyst    • Pneumonia    • PONV (postoperative nausea and vomiting)    • Sleep apnea     CPAP,instructed to bring DOS.   • Spinal headache    • Wears glasses      No current facility-administered medications on file prior to encounter.      Current Outpatient Medications on File Prior to Encounter   Medication Sig Dispense Refill   • Accu-Chek Guide test strip Daily.     • acetaminophen (TYLENOL) 500 MG tablet Take 2 tablets by mouth Every 8 (Eight) Hours As Needed for Mild or Moderate Pain . 60 tablet 0   • BYSTOLIC 10 MG tablet Take 1 tablet by mouth Daily. 30 tablet 0   • Cholecalciferol (VITAMIN D3 PO) Take 2,000 Units by mouth Daily.     • DULoxetine (CYMBALTA) 60 MG capsule duloxetine 60 mg capsule,delayed release    TK 1 C PO BID     • levothyroxine (SYNTHROID, LEVOTHROID) 137 MCG tablet Take 137 mcg by mouth Daily.     • levothyroxine (SYNTHROID, LEVOTHROID) 200 MCG tablet Take 1 tablet by mouth Daily. (Patient taking differently: Take 137 mcg by mouth Daily.) 30 tablet 11   • LORazepam (ATIVAN) 0.5 MG tablet Take 0.5 mg by mouth 2 (Two) Times a Day As Needed.  0   • tiZANidine (ZANAFLEX) 4 MG tablet Take 4 mg by mouth At Night As Needed for Muscle Spasms.       No Known Allergies  Past Surgical History:   Procedure Laterality Date   • CARPAL TUNNEL RELEASE Bilateral    •  SECTION      times three   • CHOLECYSTECTOMY     • COLONOSCOPY     • D&C HYSTEROSCOPY ENDOMETRIAL ABLATION Left 10/14/2020    Procedure: DILATATION AND CURETTAGE HYSTEROSCOPY;  Surgeon: Liz Mendoza MD;  Location: [facility];  Service: Obstetrics/Gynecology;  Laterality: Left;  Patient states that they were unable to do the ablation due to an abnormality with her uterus that was found during surgery.   • ELBOW ARTHROPLASTY Left    • ENDOSCOPY     • SKIN BIOPSY     • WISDOM TOOTH EXTRACTION       Family History   Problem Relation Age of Onset   • Arthritis Other    • Cancer Other    • Thyroid disease Other    • Diabetes Other    • Mental illness Other    • Heart attack Other    • Arthritis Mother    • Cancer Mother    • Thyroid disease Mother    • Diabetes Father    • Mental illness Father    • Mental illness Sister    • Heart attack Other    • Cancer Other    • Cancer Other    • Diabetes Daughter      Social History     Socioeconomic History   • Marital status:      Spouse name: Not on file   • Number of children: Not on file   • Years of education: Not on file   • Highest education level: Not on file   Social Needs   • Financial resource strain: Not hard at all   • Food insecurity     Worry: Never true     Inability: Never true   • Transportation needs     Medical: No     Non-medical: No   Tobacco Use   • Smoking status: Never Smoker   •  Smokeless tobacco: Never Used   Substance and Sexual Activity   • Alcohol use: No   • Drug use: No   • Sexual activity: Yes     Partners: Male     Birth control/protection: None     Comment: vasectomy   Lifestyle   • Physical activity     Days per week: 0 days     Minutes per session: 0 min   • Stress: Only a little     Review of Systems  The following systems were reviewed and negative:  constitution, eyes, ENT, respiratory, cardiovascular, gastrointestinal, genitourinary, integument, breast, hematologic / lymphatic, musculoskeletal, neurological, behavioral/psych, endocrine and allergies / immunologic     Objective  Recent Vitals       10/14/2020 10/14/2020 10/23/2020       BP:  --  127/72  122/68     Pulse:  --  73  --     Weight:  --  --  108 kg (237 lb)     BMI (Calculated):  --  --  40.7         Physical Exam:  General Appearance: alert, appears stated age and cooperative  Head: normocephalic, without obvious abnormality and atraumatic  Eyes: lids and lashes normal, conjunctivae and sclerae normal, no icterus, no pallor and corneas clear  Lungs: clear to auscultation, respirations regular, respirations even and respirations unlabored  Heart: regular rhythm and normal rate, normal S1, S2, no murmur, gallop, or rubs and no click  Abdomen: normal bowel sounds, no masses, no hepatomegaly, no splenomegaly, soft non-tender, no guarding and no rebound tenderness  Extremities: moves extremities well, no edema, no cyanosis and no redness  Skin: no bleeding, bruising or rash and no lesions noted  Psych: normal mood and affect, oriented to person, time and place, thought content organized and appropriate judgment    Labs  Lab Results   Component Value Date     (H) 03/02/2021    HGB 10.6 (L) 03/02/2021    HCT 36.3 03/02/2021    WBC 9.47 03/02/2021     03/02/2021    K 3.4 (L) 03/02/2021     03/02/2021    CO2 25.1 03/02/2021    BUN 16 03/02/2021    CREATININE 0.96 03/02/2021    GLUCOSE 122 (H) 03/02/2021     ALBUMIN 4.40 08/14/2020    CALCIUM 9.0 03/02/2021    AST 57 (H) 08/14/2020    ALT 60 (H) 08/14/2020    BILITOT 0.4 08/14/2020      Lab Results   Component Value Date    SQUAMEPIUA 0-2 03/02/2021    SPECGRAVUR >=1.030 03/02/2021    KETONESU 15 mg/dL (1+) (A) 03/02/2021    BLOODU Small (1+) (A) 03/02/2021    LEUKOCYTESUR Negative 03/02/2021    NITRITEU Negative 03/02/2021    RBCUA None Seen 03/02/2021    WBCUA None Seen 03/02/2021    BACTERIA Trace (A) 03/02/2021          Lab Results   Component Value Date    URINECX No growth 10/12/2020        Assessment   1. Menorrhagia with irregular cycles  2. History of ovarian cysts  3. Abnormal endometrium     Plan   1. Laparoscopic-assisted vaginal hysterectomy with bilateral salpingo-oophorectomy, cystoscopy.  2. ABx and DVT prophylaxis as indicated.  3. Risks, complications, benefits, and other alternatives discussed.  4. All questions answered and pt in agreement with plan.    Liz Mendoza M.D.  3/4/2021  16:02 EST

## 2021-03-05 ENCOUNTER — HOSPITAL ENCOUNTER (OUTPATIENT)
Facility: HOSPITAL | Age: 46
Discharge: HOME | End: 2021-03-07
Attending: OBSTETRICS & GYNECOLOGY | Admitting: OBSTETRICS & GYNECOLOGY

## 2021-03-05 ENCOUNTER — ANESTHESIA (OUTPATIENT)
Dept: PERIOP | Facility: HOSPITAL | Age: 46
End: 2021-03-05

## 2021-03-05 ENCOUNTER — ANESTHESIA EVENT (OUTPATIENT)
Dept: PERIOP | Facility: HOSPITAL | Age: 46
End: 2021-03-05

## 2021-03-05 DIAGNOSIS — N92.1 MENORRHAGIA WITH IRREGULAR CYCLE: ICD-10-CM

## 2021-03-05 DIAGNOSIS — Z90.710 S/P HYSTERECTOMY: Primary | ICD-10-CM

## 2021-03-05 LAB
ABO GROUP BLD: NORMAL
BLD GP AB SCN SERPL QL: NEGATIVE
GLUCOSE BLDC GLUCOMTR-MCNC: 126 MG/DL (ref 70–130)
GLUCOSE BLDC GLUCOMTR-MCNC: 148 MG/DL (ref 70–130)
GLUCOSE BLDC GLUCOMTR-MCNC: 185 MG/DL (ref 70–130)
RH BLD: NEGATIVE
T&S EXPIRATION DATE: NORMAL

## 2021-03-05 PROCEDURE — 25010000002 HYDROMORPHONE 1 MG/ML SOLUTION: Performed by: NURSE ANESTHETIST, CERTIFIED REGISTERED

## 2021-03-05 PROCEDURE — 25010000002 HYDROMORPHONE PER 4 MG: Performed by: NURSE ANESTHETIST, CERTIFIED REGISTERED

## 2021-03-05 PROCEDURE — 25010000002 PROPOFOL 200 MG/20ML EMULSION: Performed by: NURSE ANESTHETIST, CERTIFIED REGISTERED

## 2021-03-05 PROCEDURE — 25010000002 ONDANSETRON PER 1 MG: Performed by: NURSE ANESTHETIST, CERTIFIED REGISTERED

## 2021-03-05 PROCEDURE — 86900 BLOOD TYPING SEROLOGIC ABO: CPT | Performed by: OBSTETRICS & GYNECOLOGY

## 2021-03-05 PROCEDURE — 94799 UNLISTED PULMONARY SVC/PX: CPT

## 2021-03-05 PROCEDURE — 58552 LAPARO-VAG HYST INCL T/O: CPT | Performed by: OBSTETRICS & GYNECOLOGY

## 2021-03-05 PROCEDURE — 25010000002 HYDROMORPHONE 1 MG/ML SOLUTION: Performed by: OBSTETRICS & GYNECOLOGY

## 2021-03-05 PROCEDURE — G0378 HOSPITAL OBSERVATION PER HR: HCPCS

## 2021-03-05 PROCEDURE — 25010000003 MEPERIDINE PER 100 MG: Performed by: NURSE ANESTHETIST, CERTIFIED REGISTERED

## 2021-03-05 PROCEDURE — 82962 GLUCOSE BLOOD TEST: CPT

## 2021-03-05 PROCEDURE — 86901 BLOOD TYPING SEROLOGIC RH(D): CPT | Performed by: OBSTETRICS & GYNECOLOGY

## 2021-03-05 PROCEDURE — 25010000002 DEXAMETHASONE PER 1 MG: Performed by: NURSE ANESTHETIST, CERTIFIED REGISTERED

## 2021-03-05 PROCEDURE — 63710000001 INSULIN ASPART PER 5 UNITS: Performed by: OBSTETRICS & GYNECOLOGY

## 2021-03-05 PROCEDURE — 25010000003 CEFAZOLIN SODIUM-DEXTROSE 2-3 GM-%(50ML) RECONSTITUTED SOLUTION: Performed by: OBSTETRICS & GYNECOLOGY

## 2021-03-05 PROCEDURE — 25010000002 SUCCINYLCHOLINE PER 20 MG: Performed by: NURSE ANESTHETIST, CERTIFIED REGISTERED

## 2021-03-05 PROCEDURE — 86850 RBC ANTIBODY SCREEN: CPT | Performed by: OBSTETRICS & GYNECOLOGY

## 2021-03-05 DEVICE — FLOSEAL HEMOSTATIC MATRIX, 10ML
Type: IMPLANTABLE DEVICE | Site: ABDOMEN | Status: FUNCTIONAL
Brand: FLOSEAL HEMOSTATIC MATRIX

## 2021-03-05 DEVICE — ABSORBABLE HEMOSTAT (OXIDIZED REGENERATED CELLULOSE, U.S.P.)
Type: IMPLANTABLE DEVICE | Site: ABDOMEN | Status: FUNCTIONAL
Brand: SURGICEL

## 2021-03-05 RX ORDER — SODIUM CHLORIDE, SODIUM LACTATE, POTASSIUM CHLORIDE, CALCIUM CHLORIDE 600; 310; 30; 20 MG/100ML; MG/100ML; MG/100ML; MG/100ML
1000 INJECTION, SOLUTION INTRAVENOUS CONTINUOUS
Status: DISCONTINUED | OUTPATIENT
Start: 2021-03-05 | End: 2021-03-05 | Stop reason: HOSPADM

## 2021-03-05 RX ORDER — SCOLOPAMINE TRANSDERMAL SYSTEM 1 MG/1
PATCH, EXTENDED RELEASE TRANSDERMAL
Status: COMPLETED
Start: 2021-03-05 | End: 2021-03-05

## 2021-03-05 RX ORDER — MAGNESIUM HYDROXIDE 1200 MG/15ML
LIQUID ORAL AS NEEDED
Status: DISCONTINUED | OUTPATIENT
Start: 2021-03-05 | End: 2021-03-05 | Stop reason: HOSPADM

## 2021-03-05 RX ORDER — SUCCINYLCHOLINE CHLORIDE 20 MG/ML
INJECTION INTRAMUSCULAR; INTRAVENOUS AS NEEDED
Status: DISCONTINUED | OUTPATIENT
Start: 2021-03-05 | End: 2021-03-05 | Stop reason: SURG

## 2021-03-05 RX ORDER — IBUPROFEN 800 MG/1
800 TABLET ORAL EVERY 8 HOURS
Status: DISCONTINUED | OUTPATIENT
Start: 2021-03-06 | End: 2021-03-07 | Stop reason: HOSPADM

## 2021-03-05 RX ORDER — SCOLOPAMINE TRANSDERMAL SYSTEM 1 MG/1
PATCH, EXTENDED RELEASE TRANSDERMAL AS NEEDED
Status: DISCONTINUED | OUTPATIENT
Start: 2021-03-05 | End: 2021-03-05 | Stop reason: SURG

## 2021-03-05 RX ORDER — LIDOCAINE HYDROCHLORIDE 20 MG/ML
INJECTION, SOLUTION INTRAVENOUS AS NEEDED
Status: DISCONTINUED | OUTPATIENT
Start: 2021-03-05 | End: 2021-03-05 | Stop reason: SURG

## 2021-03-05 RX ORDER — ONDANSETRON 2 MG/ML
4 INJECTION INTRAMUSCULAR; INTRAVENOUS EVERY 6 HOURS PRN
Status: DISCONTINUED | OUTPATIENT
Start: 2021-03-05 | End: 2021-03-07 | Stop reason: HOSPADM

## 2021-03-05 RX ORDER — OXYCODONE HYDROCHLORIDE 5 MG/1
5 TABLET ORAL EVERY 4 HOURS PRN
Status: DISCONTINUED | OUTPATIENT
Start: 2021-03-05 | End: 2021-03-07 | Stop reason: HOSPADM

## 2021-03-05 RX ORDER — ACETAMINOPHEN 325 MG/1
TABLET ORAL AS NEEDED
Status: DISCONTINUED | OUTPATIENT
Start: 2021-03-05 | End: 2021-03-05 | Stop reason: SURG

## 2021-03-05 RX ORDER — MEPERIDINE HYDROCHLORIDE 25 MG/ML
25 INJECTION INTRAMUSCULAR; INTRAVENOUS; SUBCUTANEOUS ONCE
Status: COMPLETED | OUTPATIENT
Start: 2021-03-05 | End: 2021-03-05

## 2021-03-05 RX ORDER — SODIUM CHLORIDE 0.9 % (FLUSH) 0.9 %
10 SYRINGE (ML) INJECTION AS NEEDED
Status: DISCONTINUED | OUTPATIENT
Start: 2021-03-05 | End: 2021-03-05 | Stop reason: HOSPADM

## 2021-03-05 RX ORDER — DEXTROSE MONOHYDRATE 25 G/50ML
25 INJECTION, SOLUTION INTRAVENOUS
Status: DISCONTINUED | OUTPATIENT
Start: 2021-03-05 | End: 2021-03-07 | Stop reason: HOSPADM

## 2021-03-05 RX ORDER — PHENAZOPYRIDINE HYDROCHLORIDE 100 MG/1
200 TABLET, FILM COATED ORAL ONCE
Status: COMPLETED | OUTPATIENT
Start: 2021-03-05 | End: 2021-03-05

## 2021-03-05 RX ORDER — NALOXONE HCL 0.4 MG/ML
0.4 VIAL (ML) INJECTION
Status: DISCONTINUED | OUTPATIENT
Start: 2021-03-05 | End: 2021-03-07 | Stop reason: HOSPADM

## 2021-03-05 RX ORDER — OXYCODONE HYDROCHLORIDE 5 MG/1
10 TABLET ORAL EVERY 4 HOURS PRN
Status: DISCONTINUED | OUTPATIENT
Start: 2021-03-05 | End: 2021-03-07 | Stop reason: HOSPADM

## 2021-03-05 RX ORDER — NALOXONE HCL 0.4 MG/ML
0.1 VIAL (ML) INJECTION
Status: DISCONTINUED | OUTPATIENT
Start: 2021-03-05 | End: 2021-03-07 | Stop reason: HOSPADM

## 2021-03-05 RX ORDER — PROPOFOL 10 MG/ML
INJECTION, EMULSION INTRAVENOUS AS NEEDED
Status: DISCONTINUED | OUTPATIENT
Start: 2021-03-05 | End: 2021-03-05 | Stop reason: SURG

## 2021-03-05 RX ORDER — SIMETHICONE 80 MG
80 TABLET,CHEWABLE ORAL 4 TIMES DAILY PRN
Status: DISCONTINUED | OUTPATIENT
Start: 2021-03-05 | End: 2021-03-07 | Stop reason: HOSPADM

## 2021-03-05 RX ORDER — ACETAMINOPHEN 500 MG
1000 TABLET ORAL EVERY 8 HOURS
Status: DISCONTINUED | OUTPATIENT
Start: 2021-03-05 | End: 2021-03-07 | Stop reason: HOSPADM

## 2021-03-05 RX ORDER — CEFAZOLIN SODIUM 2 G/50ML
2 SOLUTION INTRAVENOUS ONCE
Status: COMPLETED | OUTPATIENT
Start: 2021-03-05 | End: 2021-03-05

## 2021-03-05 RX ORDER — HYDROMORPHONE HCL 110MG/55ML
PATIENT CONTROLLED ANALGESIA SYRINGE INTRAVENOUS AS NEEDED
Status: DISCONTINUED | OUTPATIENT
Start: 2021-03-05 | End: 2021-03-05 | Stop reason: SURG

## 2021-03-05 RX ORDER — SODIUM CHLORIDE, SODIUM LACTATE, POTASSIUM CHLORIDE, CALCIUM CHLORIDE 600; 310; 30; 20 MG/100ML; MG/100ML; MG/100ML; MG/100ML
100 INJECTION, SOLUTION INTRAVENOUS CONTINUOUS
Status: DISCONTINUED | OUTPATIENT
Start: 2021-03-05 | End: 2021-03-07 | Stop reason: HOSPADM

## 2021-03-05 RX ORDER — ROCURONIUM BROMIDE 10 MG/ML
INJECTION, SOLUTION INTRAVENOUS AS NEEDED
Status: DISCONTINUED | OUTPATIENT
Start: 2021-03-05 | End: 2021-03-05 | Stop reason: SURG

## 2021-03-05 RX ORDER — MORPHINE SULFATE 2 MG/ML
2 INJECTION, SOLUTION INTRAMUSCULAR; INTRAVENOUS
Status: DISCONTINUED | OUTPATIENT
Start: 2021-03-05 | End: 2021-03-05 | Stop reason: HOSPADM

## 2021-03-05 RX ORDER — ONDANSETRON 2 MG/ML
4 INJECTION INTRAMUSCULAR; INTRAVENOUS ONCE AS NEEDED
Status: DISCONTINUED | OUTPATIENT
Start: 2021-03-05 | End: 2021-03-05 | Stop reason: HOSPADM

## 2021-03-05 RX ORDER — ONDANSETRON 2 MG/ML
INJECTION INTRAMUSCULAR; INTRAVENOUS AS NEEDED
Status: DISCONTINUED | OUTPATIENT
Start: 2021-03-05 | End: 2021-03-05 | Stop reason: SURG

## 2021-03-05 RX ORDER — DEXAMETHASONE SODIUM PHOSPHATE 4 MG/ML
INJECTION, SOLUTION INTRA-ARTICULAR; INTRALESIONAL; INTRAMUSCULAR; INTRAVENOUS; SOFT TISSUE AS NEEDED
Status: DISCONTINUED | OUTPATIENT
Start: 2021-03-05 | End: 2021-03-05 | Stop reason: SURG

## 2021-03-05 RX ORDER — ACETAMINOPHEN 500 MG
TABLET ORAL
Status: COMPLETED
Start: 2021-03-05 | End: 2021-03-05

## 2021-03-05 RX ORDER — DOCUSATE SODIUM 100 MG/1
100 CAPSULE, LIQUID FILLED ORAL 2 TIMES DAILY PRN
Status: DISCONTINUED | OUTPATIENT
Start: 2021-03-05 | End: 2021-03-07 | Stop reason: HOSPADM

## 2021-03-05 RX ORDER — ONDANSETRON 4 MG/1
4 TABLET, FILM COATED ORAL EVERY 6 HOURS PRN
Status: DISCONTINUED | OUTPATIENT
Start: 2021-03-05 | End: 2021-03-07 | Stop reason: HOSPADM

## 2021-03-05 RX ORDER — NICOTINE POLACRILEX 4 MG
1 LOZENGE BUCCAL
Status: DISCONTINUED | OUTPATIENT
Start: 2021-03-05 | End: 2021-03-07 | Stop reason: HOSPADM

## 2021-03-05 RX ORDER — NEOSTIGMINE METHYLSULFATE 5 MG/5 ML
SYRINGE (ML) INTRAVENOUS AS NEEDED
Status: DISCONTINUED | OUTPATIENT
Start: 2021-03-05 | End: 2021-03-05 | Stop reason: SURG

## 2021-03-05 RX ORDER — SODIUM CHLORIDE 0.9 % (FLUSH) 0.9 %
3 SYRINGE (ML) INJECTION EVERY 12 HOURS SCHEDULED
Status: DISCONTINUED | OUTPATIENT
Start: 2021-03-05 | End: 2021-03-05 | Stop reason: HOSPADM

## 2021-03-05 RX ORDER — BISACODYL 10 MG
10 SUPPOSITORY, RECTAL RECTAL DAILY PRN
Status: DISCONTINUED | OUTPATIENT
Start: 2021-03-05 | End: 2021-03-07 | Stop reason: HOSPADM

## 2021-03-05 RX ADMIN — HYDROMORPHONE HYDROCHLORIDE 0.5 MG: 1 INJECTION, SOLUTION INTRAMUSCULAR; INTRAVENOUS; SUBCUTANEOUS at 10:40

## 2021-03-05 RX ADMIN — PHENAZOPYRIDINE HYDROCHLORIDE 200 MG: 100 TABLET ORAL at 06:32

## 2021-03-05 RX ADMIN — ROCURONIUM BROMIDE 30 MG: 10 INJECTION INTRAVENOUS at 07:37

## 2021-03-05 RX ADMIN — INSULIN ASPART 2 UNITS: 100 INJECTION, SOLUTION INTRAVENOUS; SUBCUTANEOUS at 16:56

## 2021-03-05 RX ADMIN — DEXAMETHASONE SODIUM PHOSPHATE 4 MG: 4 INJECTION, SOLUTION INTRAMUSCULAR; INTRAVENOUS at 07:37

## 2021-03-05 RX ADMIN — MEPERIDINE HYDROCHLORIDE 25 MG: 25 INJECTION, SOLUTION INTRAMUSCULAR; INTRAVENOUS; SUBCUTANEOUS at 11:06

## 2021-03-05 RX ADMIN — SUCCINYLCHOLINE CHLORIDE 100 MG: 20 INJECTION, SOLUTION INTRAMUSCULAR; INTRAVENOUS at 07:37

## 2021-03-05 RX ADMIN — HYDROMORPHONE HYDROCHLORIDE 2 MG: 2 INJECTION, SOLUTION INTRAMUSCULAR; INTRAVENOUS; SUBCUTANEOUS at 07:37

## 2021-03-05 RX ADMIN — SODIUM CHLORIDE, POTASSIUM CHLORIDE, SODIUM LACTATE AND CALCIUM CHLORIDE 100 ML/HR: 600; 310; 30; 20 INJECTION, SOLUTION INTRAVENOUS at 17:05

## 2021-03-05 RX ADMIN — HYDROMORPHONE HYDROCHLORIDE 0.5 MG: 1 INJECTION, SOLUTION INTRAMUSCULAR; INTRAVENOUS; SUBCUTANEOUS at 13:53

## 2021-03-05 RX ADMIN — ACETAMINOPHEN 1000 MG: 325 TABLET ORAL at 07:25

## 2021-03-05 RX ADMIN — SCOPALAMINE 1 PATCH: 1 PATCH, EXTENDED RELEASE TRANSDERMAL at 07:25

## 2021-03-05 RX ADMIN — OXYCODONE 10 MG: 5 TABLET ORAL at 21:32

## 2021-03-05 RX ADMIN — ONDANSETRON 4 MG: 2 INJECTION INTRAMUSCULAR; INTRAVENOUS at 07:37

## 2021-03-05 RX ADMIN — SODIUM CHLORIDE, POTASSIUM CHLORIDE, SODIUM LACTATE AND CALCIUM CHLORIDE 1000 ML: 600; 310; 30; 20 INJECTION, SOLUTION INTRAVENOUS at 07:13

## 2021-03-05 RX ADMIN — SODIUM CHLORIDE, POTASSIUM CHLORIDE, SODIUM LACTATE AND CALCIUM CHLORIDE: 600; 310; 30; 20 INJECTION, SOLUTION INTRAVENOUS at 09:31

## 2021-03-05 RX ADMIN — CEFAZOLIN SODIUM 2 G: 2 SOLUTION INTRAVENOUS at 07:30

## 2021-03-05 RX ADMIN — ACETAMINOPHEN 1000 MG: 500 TABLET, COATED ORAL at 21:31

## 2021-03-05 RX ADMIN — LIDOCAINE HYDROCHLORIDE 60 MG: 20 INJECTION, SOLUTION INTRAVENOUS at 07:37

## 2021-03-05 RX ADMIN — ACETAMINOPHEN 1000 MG: 500 TABLET, COATED ORAL at 15:48

## 2021-03-05 RX ADMIN — HYDROMORPHONE HYDROCHLORIDE 0.5 MG: 1 INJECTION, SOLUTION INTRAMUSCULAR; INTRAVENOUS; SUBCUTANEOUS at 10:16

## 2021-03-05 RX ADMIN — Medication 5 MG: at 09:31

## 2021-03-05 RX ADMIN — PROPOFOL 200 MG: 10 INJECTION, EMULSION INTRAVENOUS at 07:37

## 2021-03-05 RX ADMIN — GLYCOPYRROLATE 0.4 MG: 0.2 INJECTION, SOLUTION INTRAMUSCULAR; INTRAVENOUS at 09:31

## 2021-03-05 NOTE — ANESTHESIA POSTPROCEDURE EVALUATION
Patient: Debi Munoz    Procedure Summary     Date: 03/05/21 Room / Location: [facility] OR 2 /  [facility]    Anesthesia Start: 0720 Anesthesia Stop: 0944    Procedures:       LAPAROSCOPIC ASSISTED VAGINAL HYSTERECTOMY BILATERAL SALPINGO OOPHORECTOMY (N/A Abdomen)      CYSTOSCOPY (N/A Urethra) Diagnosis:       Menorrhagia with irregular cycle      (Menorrhagia with irregular cycle [N92.1])    Surgeon: Liz Mendoza MD Provider: Ambrocio Reed CRNA    Anesthesia Type: general ASA Status: 1          Anesthesia Type: general    Vitals  Vitals Value Taken Time   /57 03/05/21 1145   Temp 98.8 °F (37.1 °C) 03/05/21 1145   Pulse 71 03/05/21 1146   Resp 16 03/05/21 1145   SpO2 96 % 03/05/21 1146   Vitals shown include unvalidated device data.        Post Anesthesia Care and Evaluation    Patient location during evaluation: PACU  Patient participation: complete - patient participated  Level of consciousness: awake  Pain score: 3  Pain management: adequate  Airway patency: patent  Anesthetic complications: No anesthetic complications  PONV Status: controlled  Cardiovascular status: acceptable and stable  Respiratory status: acceptable and face mask  Hydration status: acceptable

## 2021-03-05 NOTE — OP NOTE
BH [facility]o Munoz  : 1975  MRN: [id number]  [facility]: [id number]  Date: 3/5/2021      Operative Report  Pre-op Diagnosis:  Menorrhagia with irregular cycle [N92.1]   Left ovarian cyst  Pelvic pain  Dysmenorrhea   Post-op Diagnosis:  Post-Op Diagnosis Codes:     * Menorrhagia with irregular cycle [N92.1]       Same + adhesive disease   Procedure: Procedure(s):  LAPAROSCOPIC ASSISTED VAGINAL HYSTERECTOMY BILATERAL SALPINGO OOPHORECTOMY WITH LYSIS OF ADHESIONS,   CYSTOSCOPY   Surgeon: Liz Mendoza M.D.   Assist: MICHELLE De La Paz  was responsible for performing the following activities: Retraction, suctioning, closing, and their skilled assistance was necessary for the success of this case.     Anesthesia: General   Estimated Blood Loss: 300 mls   Drains: Bhatia   ABx: cefazolin 2 gms   Specimens:  Uterus, ovaries, and tubes   Findings: Uterus retroverted and enlarged; adhesions of omentum to anterior abdominal wall; adhesions of bladder to lower uterine segment.   Complications: none   Indications: See H&P     Description of Procedure:  After the appropriate time out and after adequate dosing of anesthesia, the patient was prepped and draped in the usual sterile fashion.  A bhatia catheter was placed in the bladder per nursing for drainage during the procedure.  She was placed in the dorsal lithotomy position using Dinesh stirrups.  A weighted speculum was placed in the vagina.  The anterior lip of the cervix was grasped with a single tooth tenaculum.  An acorn cannula was placed through the cervix to be used for manipulation during the procedure.  A 2 cm infraumbilical skin incision was made with the knife.  A 10 mm trocar was inserted under direct visualization with the Optiview without any complications.  The abdomen was insufflated with carbon dioxide being sure to keep the pressure less than 15 mmHg.  The patient was placed in Trendelenburg position.  Two ancillary 5 mm trocars were placed in both  the right and left lower quadrants, lateral to the epigastric vessels, under direct visualization without any complications.  The pelvis was explored with the above findings noted.  Using the Ligasure, the omentum was taken off the anterior abdominal wall for better visualization without any complications.  Using the LigaSure, the infundibulopelvic ligaments and round ligaments were taken down sharply to the level of the uterine vessels.  The ureters were identified and noted to be well out of the operative fields at all times. The anterior bladder serosa was dissected off the lower uterine segment.   Attention was then turned toward the vaginal aspect of the procedure.  All instruments had been removed and trocar sleeves left in place.  The weighted speculum was again placed in the vagina.  The posterior mucosa was grasped with pickup forceps and excised sharply with curved Linda scissors.  The posterior cul-de-sac was entered without incident.  The uterosacral ligaments were clamped bilaterally using curved John clamps, excised, and sutured using O Vicryl trans-fixating sutures.  The anterior mucosa was circumcised sharply using curved Linda scissors.  The anterior peritoneum was identified and the anterior cul-de-sac was entered sharply without incident.  The remaining broad and cardinal ligaments were taken down in succession using curved John clamps, excised, and trans-fixated using 0 Vicryl suture.  The specimen was removed and sent for pathologic examination.  The pedicles were inspected vaginally and noted to be hemostatic.  Angle sutures had been placed bilaterally using 0 chromic.  The cuff was then closed with the first suture incorporating the uterosacral ligaments bilaterally as well as the anterior and posterior peritoneum using 0 chromic suture.  The remainder of the cuff was closed with 0 chromic suture in a running, locking, fashion.  The bhatia catheter was removed.  Rigid cystoscopy was performed  which revealed bilateral ureteral jet flow of Pyridium stained urine.  There was no evidence of injury to the bladder mucosa.  The bhatia catheter was re-anchored.  Repeat inspection of the cuff revealed adequate hemostasis.  A vaginal pack was placed.  The abdomen was again instilled with carbon dioxide.  All pedicles were inspected laparoscopically after suction and irritation.  There was noted to be a small vessel bleeding on the right under the bladder serosa near the round ligament.  Using the LigaSure this was ligated with adequate hemostasis.  The bladder and ureter was noted to be out of the operative field.  FloSeal and Surgicel was then placed over the area as well as the vaginal cuff.  The pedicles were noted to be hemostatic.  The abdomen was released of carbon dioxide and repeat inspection of the pedicles and ancillary trocar sites revealed adequate hemostasis.  The trocar sleeves had all been removed.  The skin was approximated with 4-0 nylon in an interrupted fashion.  All sponge and instrument counts were correct at the end of the procedure.  The patient tolerated the procedure well.  There were no complications.  She was taken to the postoperative recovery room in stable condition.    Liz Mendoza M.D.  3/5/2021  09:50 EST

## 2021-03-05 NOTE — ANESTHESIA PROCEDURE NOTES
Airway  Urgency: elective    Date/Time: 3/5/2021 7:40 AM  Airway not difficult    General Information and Staff    Patient location during procedure: OR  CRNA: Ambrocio Reed CRNA    Indications and Patient Condition  Indications for airway management: airway protection    Preoxygenated: yes  Mask difficulty assessment: 1 - vent by mask    Final Airway Details  Final airway type: endotracheal airway      Successful airway: ETT  Cuffed: yes   Successful intubation technique: direct laryngoscopy  Endotracheal tube insertion site: oral  Blade: Cecille  Blade size: 3  ETT size (mm): 7.0  Cormack-Lehane Classification: grade I - full view of glottis  Placement verified by: chest auscultation and capnometry   Measured from: lips  ETT/EBT  to lips (cm): 21  Number of attempts at approach: 1  Assessment: lips, teeth, and gum same as pre-op and atraumatic intubation

## 2021-03-05 NOTE — ANESTHESIA PREPROCEDURE EVALUATION
Anesthesia Evaluation     Patient summary reviewed and Nursing notes reviewed   history of anesthetic complications: PONV  NPO Solid Status: > 8 hours  NPO Liquid Status: > 8 hours           Airway   Mallampati: I  TM distance: >3 FB  Neck ROM: full  no difficulty expected  Dental - normal exam     Pulmonary - normal exam   (+) sleep apnea,   Cardiovascular - normal exam    (+) hypertension, hyperlipidemia,       Neuro/Psych  (+) headaches, psychiatric history Bipolar and Depression,     GI/Hepatic/Renal/Endo    (+)  GERD,  hepatitis, liver disease, diabetes mellitus,     Musculoskeletal (-) negative ROS    Abdominal    Substance History - negative use     OB/GYN negative ob/gyn ROS         Other - negative ROS                       Anesthesia Plan    ASA 1     general     intravenous induction     Anesthetic plan, all risks, benefits, and alternatives have been provided, discussed and informed consent has been obtained with: patient.

## 2021-03-06 LAB
DEPRECATED RDW RBC AUTO: 45.2 FL (ref 37–54)
ERYTHROCYTE [DISTWIDTH] IN BLOOD BY AUTOMATED COUNT: 17.7 % (ref 12.3–15.4)
GLUCOSE BLDC GLUCOMTR-MCNC: 139 MG/DL (ref 70–130)
GLUCOSE BLDC GLUCOMTR-MCNC: 179 MG/DL (ref 70–130)
GLUCOSE BLDC GLUCOMTR-MCNC: 193 MG/DL (ref 70–130)
GLUCOSE BLDC GLUCOMTR-MCNC: 194 MG/DL (ref 70–130)
HCT VFR BLD AUTO: 26.9 % (ref 34–46.6)
HGB BLD-MCNC: 7.9 G/DL (ref 12–15.9)
MCH RBC QN AUTO: 21.2 PG (ref 26.6–33)
MCHC RBC AUTO-ENTMCNC: 29.4 G/DL (ref 31.5–35.7)
MCV RBC AUTO: 72.3 FL (ref 79–97)
PLATELET # BLD AUTO: 326 10*3/MM3 (ref 140–450)
PMV BLD AUTO: 10 FL (ref 6–12)
RBC # BLD AUTO: 3.72 10*6/MM3 (ref 3.77–5.28)
WBC # BLD AUTO: 10.51 10*3/MM3 (ref 3.4–10.8)

## 2021-03-06 PROCEDURE — 94799 UNLISTED PULMONARY SVC/PX: CPT

## 2021-03-06 PROCEDURE — 82962 GLUCOSE BLOOD TEST: CPT

## 2021-03-06 PROCEDURE — 63710000001 INSULIN ASPART PER 5 UNITS: Performed by: OBSTETRICS & GYNECOLOGY

## 2021-03-06 PROCEDURE — G0378 HOSPITAL OBSERVATION PER HR: HCPCS

## 2021-03-06 PROCEDURE — 85027 COMPLETE CBC AUTOMATED: CPT | Performed by: OBSTETRICS & GYNECOLOGY

## 2021-03-06 PROCEDURE — 99024 POSTOP FOLLOW-UP VISIT: CPT | Performed by: NURSE PRACTITIONER

## 2021-03-06 RX ADMIN — IBUPROFEN 800 MG: 800 TABLET ORAL at 17:42

## 2021-03-06 RX ADMIN — IBUPROFEN 800 MG: 800 TABLET ORAL at 11:31

## 2021-03-06 RX ADMIN — ACETAMINOPHEN 1000 MG: 500 TABLET, COATED ORAL at 15:10

## 2021-03-06 RX ADMIN — INSULIN ASPART 2 UNITS: 100 INJECTION, SOLUTION INTRAVENOUS; SUBCUTANEOUS at 11:31

## 2021-03-06 RX ADMIN — INSULIN ASPART 2 UNITS: 100 INJECTION, SOLUTION INTRAVENOUS; SUBCUTANEOUS at 16:19

## 2021-03-06 RX ADMIN — OXYCODONE 10 MG: 5 TABLET ORAL at 08:34

## 2021-03-06 RX ADMIN — IBUPROFEN 800 MG: 800 TABLET ORAL at 02:00

## 2021-03-06 RX ADMIN — SODIUM CHLORIDE, POTASSIUM CHLORIDE, SODIUM LACTATE AND CALCIUM CHLORIDE 100 ML/HR: 600; 310; 30; 20 INJECTION, SOLUTION INTRAVENOUS at 02:00

## 2021-03-06 RX ADMIN — ACETAMINOPHEN 1000 MG: 500 TABLET, COATED ORAL at 06:01

## 2021-03-06 NOTE — PROGRESS NOTES
" Sixto Munoz  : 1975  MRN: [id number]  [facility]: [id number]    Post-operative Day #1  Procedure(s) (LRB):  LAPAROSCOPIC ASSISTED VAGINAL HYSTERECTOMY BILATERAL SALPINGO OOPHORECTOMY (N/A)  CYSTOSCOPY (N/A)   Procedure(s):  LAPAROSCOPIC ASSISTED VAGINAL HYSTERECTOMY BILATERAL SALPINGO OOPHORECTOMY  CYSTOSCOPY     Subjective     Reports this AM she was up walking - she did not feel dizzy but \"a little weak\".     She voided this AM without difficulty.     She tolerated a regular diet (consistent carb).  She has not passed gas.    Her pain meds are adequate for pain management.          Objective     Min/max vitals past 24 hours:   Temp  Min: 98.4 °F (36.9 °C)  Max: 99.5 °F (37.5 °C)  BP  Min: 113/60  Max: 140/75  Pulse  Min: 61  Max: 74  Resp  Min: 13  Max: 18        I/O last 3 completed shifts:  In: 3740 [P.O.:240; I.V.:3500]  Out: 2000 [Urine:1750; Blood:250]    Psych: Altert and oriented to time, place and person  Mood and affect appropriate   General: no acute distress  Lungs:  breathing is unlabored  Abdomen: Incision dry and intact x 3  abdomen soft + BS's hypoactive  Lower Extremities: no calf tenderness    WBC   Date/Time Value Ref Range Status   2021 0533 10.51 3.40 - 10.80 10*3/mm3 Final     Hemoglobin   Date/Time Value Ref Range Status   2021 0533 7.9 (L) 12.0 - 15.9 g/dL Final     Hematocrit   Date/Time Value Ref Range Status   2021 0533 26.9 (L) 34.0 - 46.6 % Final     Platelets   Date/Time Value Ref Range Status   2021 0533 326 140 - 450 10*3/mm3 Final      this AM       Assessment   1. Post-op Day #1 S/P   Procedure(s):  LAPAROSCOPIC ASSISTED VAGINAL HYSTERECTOMY BILATERAL SALPINGO OOPHORECTOMY  CYSTOSCOPY    Anemia  Diabetic           Plan   1. Continue routine PO care  2. iron BID, colace stool softner BID, insulin / continue sliding scale   3. Anticipate home tomorrow though potential later today.       Brenda Moreno CNM   3/6/2021  10:07 EST   "

## 2021-03-06 NOTE — PROGRESS NOTES
Patient: Debi Munoz  Procedure(s):  LAPAROSCOPIC ASSISTED VAGINAL HYSTERECTOMY BILATERAL SALPINGO OOPHORECTOMY  CYSTOSCOPY  Anesthesia type: General    Patient location: [facility] Surgical Floor  Last vitals: /63 (BP Location: Left arm, Patient Position: Lying)   Pulse 83   Temp 98.1 °F (36.7 °C) (Oral)   Resp 18   SpO2 95%   Level of consciousness: awake, alert and oriented    Post-anesthesia pain: adequate analgesia  Airway patency: patent  Respiratory: unassisted  Cardiovascular: stable and blood pressure at baseline  Hydration: euvolemic    Anesthetic complications: no

## 2021-03-07 VITALS
OXYGEN SATURATION: 96 % | SYSTOLIC BLOOD PRESSURE: 142 MMHG | HEART RATE: 74 BPM | RESPIRATION RATE: 18 BRPM | DIASTOLIC BLOOD PRESSURE: 77 MMHG | TEMPERATURE: 97.8 F

## 2021-03-07 LAB — GLUCOSE BLDC GLUCOMTR-MCNC: 143 MG/DL (ref 70–130)

## 2021-03-07 PROCEDURE — 99024 POSTOP FOLLOW-UP VISIT: CPT | Performed by: NURSE PRACTITIONER

## 2021-03-07 PROCEDURE — 82962 GLUCOSE BLOOD TEST: CPT

## 2021-03-07 PROCEDURE — G0378 HOSPITAL OBSERVATION PER HR: HCPCS

## 2021-03-07 RX ORDER — DOCUSATE SODIUM 100 MG/1
100 CAPSULE, LIQUID FILLED ORAL 2 TIMES DAILY
Qty: 60 CAPSULE | Refills: 0 | Status: SHIPPED | OUTPATIENT
Start: 2021-03-07

## 2021-03-07 RX ORDER — OXYCODONE HYDROCHLORIDE 5 MG/1
5 TABLET ORAL EVERY 4 HOURS PRN
Qty: 18 TABLET | Refills: 0 | Status: SHIPPED | OUTPATIENT
Start: 2021-03-07

## 2021-03-07 RX ORDER — FERROUS SULFATE 325(65) MG
325 TABLET ORAL
Qty: 60 TABLET | Refills: 0 | Status: SHIPPED | OUTPATIENT
Start: 2021-03-07

## 2021-03-07 RX ORDER — IBUPROFEN 800 MG/1
800 TABLET ORAL EVERY 8 HOURS PRN
Qty: 40 TABLET | Refills: 0 | Status: SHIPPED | OUTPATIENT
Start: 2021-03-07

## 2021-03-07 RX ORDER — ACETAMINOPHEN 500 MG
1000 TABLET ORAL EVERY 8 HOURS
Qty: 60 TABLET | Refills: 0 | Status: SHIPPED | OUTPATIENT
Start: 2021-03-07

## 2021-03-07 RX ADMIN — ACETAMINOPHEN 1000 MG: 500 TABLET, COATED ORAL at 05:16

## 2021-03-07 RX ADMIN — ACETAMINOPHEN 1000 MG: 500 TABLET, COATED ORAL at 02:09

## 2021-03-07 RX ADMIN — DOCUSATE SODIUM 100 MG: 100 CAPSULE, LIQUID FILLED ORAL at 08:10

## 2021-03-07 RX ADMIN — SIMETHICONE 80 MG: 80 TABLET, CHEWABLE ORAL at 08:10

## 2021-03-07 RX ADMIN — IBUPROFEN 800 MG: 800 TABLET ORAL at 10:23

## 2021-03-07 RX ADMIN — IBUPROFEN 800 MG: 800 TABLET ORAL at 01:17

## 2021-03-07 NOTE — PLAN OF CARE
Goal Outcome Evaluation:     Progress: improving  Outcome Summary: VSS. Patient with no complaints of pain. States that she feels much better than she did earlier.

## 2021-03-07 NOTE — DISCHARGE SUMMARY
"Discharge Summary     Sixto Munoz  : 1975  MRN: [id number]  CSN: [id number]    Date of Admission: 3/5/2021   Date of Discharge:  3/7/2021   Delivering Physician: Liz Mendoza MD       Admission Diagnosis: Menorrhagia with irregular cycle [N92.1]  Menorrhagia [N92.0]   Discharge Diagnosis: 1. Same as above plus         Procedures: 1. See Op Report     Hospital Course  See the completed operative report for details..    Subjective   Subjective  Patient reports:  Her post-operative course was unremarkable.    She reports she has been up yesterday - felt \"wobbly\" in AM but improved through the day.  Has been up this AM & doing well.    She is tolerating a regular diet, voiding without problems, & passing gas.    Pain management is adequate with pain meds    Her wound was healing well & without obvious signs of infections.        Objective    Objective     Vitals: Vital Signs Range for the last 24 hours  Temperature: Temp:  [97.8 °F (36.6 °C)-98.4 °F (36.9 °C)] 97.8 °F (36.6 °C)   Temp Source: Temp src: Oral   BP: BP: (101-142)/(58-77) 142/77   Pulse: Heart Rate:  [71-86] 74   Respirations: Resp:  [18] 18   SPO2: SpO2:  [95 %-100 %] 96 %   O2 Amount (l/min):     O2 Devices Device (Oxygen Therapy): room air   Weight:                      Discharge labs  Lab Results   Component Value Date    WBC 10.51 2021    HGB 7.9 (L) 2021    HCT 26.9 (L) 2021     2021       Discharge Medications     Discharge Medications      New Medications      Instructions Start Date   docusate sodium 100 MG capsule  Commonly known as: Colace   100 mg, Oral, 2 Times Daily      ferrous sulfate 325 (65 FE) MG tablet   325 mg, Oral, 2 Times Daily Before Meals      ibuprofen 800 MG tablet  Commonly known as: ADVIL,MOTRIN   800 mg, Oral, Every 8 Hours PRN      oxyCODONE 5 MG immediate release tablet  Commonly known as: Roxicodone   5 mg, Oral, Every 4 Hours PRN         Changes to Medications      " Instructions Start Date   Acetaminophen Extra Strength 500 MG tablet  Generic drug: acetaminophen  What changed: Another medication with the same name was added. Make sure you understand how and when to take each.   Take 2 tablets by mouth Every 8 (Eight) Hours As Needed for Mild or Moderate Pain .      acetaminophen 500 MG tablet  Commonly known as: TYLENOL  What changed: You were already taking a medication with the same name, and this prescription was added. Make sure you understand how and when to take each.   1,000 mg, Oral, Every 8 Hours, Alternate with ibuprofen      levothyroxine 200 MCG tablet  Commonly known as: SYNTHROID, LEVOTHROID  What changed:   · how much to take  · Another medication with the same name was removed. Continue taking this medication, and follow the directions you see here.   200 mcg, Oral, Daily         Continue These Medications      Instructions Start Date   Accu-Chek Guide test strip  Generic drug: glucose blood   Daily      Bystolic 10 MG tablet  Generic drug: nebivolol   10 mg, Oral, Daily      DULoxetine 60 MG capsule  Commonly known as: CYMBALTA   duloxetine 60 mg capsule,delayed release   TK 1 C PO BID      hydroCHLOROthiazide 25 MG tablet  Commonly known as: HYDRODIURIL   25 mg, Oral, Daily      Janumet XR  MG tablet  Generic drug: SITagliptin-metFORMIN HCl ER   1 tablet, Oral, 2 times daily      lisinopril 30 MG tablet  Commonly known as: PRINIVIL,ZESTRIL   30 mg, Oral, Daily      LORazepam 0.5 MG tablet  Commonly known as: ATIVAN   0.5 mg, Oral, 2 Times Daily PRN      ramelteon 8 MG tablet  Commonly known as: ROZEREM   8 mg, Oral, Nightly      temazepam 15 MG capsule  Commonly known as: RESTORIL   15 mg, Oral, Nightly PRN      tiZANidine 4 MG tablet  Commonly known as: ZANAFLEX   4 mg, Oral, Nightly PRN      VITAMIN D3 PO   2,000 Units, Oral, Daily             Discharge Disposition Home or Self Care   Condition on Discharge: good   Follow-up: 1 week    F/U at   [facility]     Brenda Moreno, STACEY  3/7/2021

## 2021-03-07 NOTE — PLAN OF CARE
Goal Outcome Evaluation:  Plan of Care Reviewed With: patient  Progress: improving  Outcome Summary: VSS, cont routine post op care Discharge home today

## 2021-03-09 LAB
LAB AP CASE REPORT: NORMAL
PATH REPORT.FINAL DX SPEC: NORMAL

## 2021-03-10 ENCOUNTER — OFFICE VISIT (OUTPATIENT)
Dept: OBSTETRICS AND GYNECOLOGY | Facility: CLINIC | Age: 46
End: 2021-03-10

## 2021-03-10 VITALS
HEIGHT: 64 IN | WEIGHT: 238.6 LBS | BODY MASS INDEX: 40.74 KG/M2 | DIASTOLIC BLOOD PRESSURE: 80 MMHG | SYSTOLIC BLOOD PRESSURE: 130 MMHG

## 2021-03-10 DIAGNOSIS — Z09 POSTOPERATIVE FOLLOW-UP: Primary | ICD-10-CM

## 2021-03-10 PROCEDURE — 99024 POSTOP FOLLOW-UP VISIT: CPT | Performed by: PHYSICIAN ASSISTANT

## 2021-03-10 NOTE — PROGRESS NOTES
Subjective   Chief Complaint   Patient presents with   • Post-op     5 days post-op LAVH-BSO, sutures removed and steri-strips applied, doing well       Debi Munoz is a 45 y.o. year old  presenting to be seen for post op visit  Reports doing well and has no complaints  Having normal bowel and bladder function. Scant vaginal spotting.no significant post op pain  No vasomotor symptoms  Pathology benign    Past Medical History:   Diagnosis Date   • Anxiety    • Bell's palsy    • Bipolar disorder (CMS/[facility])    • COVID-19 2021   • Depression    • Diabetes mellitus (CMS/[facility]) 2020   • Diarrhea    • Disease of thyroid gland    • Elevated cholesterol    • Fibromyalgia    • Gallstones    • Gestational diabetes    • H/O exercise stress test    • Hypertension    • Infection of kidney    • Migraine headache    • JC (nonalcoholic steatohepatitis)    • Osteoarthritis    • Ovarian cyst    • Pneumonia    • PONV (postoperative nausea and vomiting)    • Sleep apnea     CPAP,instructed to bring DOS.   • Spinal headache    • Wears glasses         Current Outpatient Medications:   •  Accu-Chek Guide test strip, Daily., Disp: , Rfl:   •  acetaminophen (TYLENOL) 500 MG tablet, Take 2 tablets by mouth Every 8 (Eight) Hours As Needed for Mild or Moderate Pain ., Disp: 60 tablet, Rfl: 0  •  acetaminophen (TYLENOL) 500 MG tablet, Take 2 tablets by mouth Every 8 (Eight) Hours. Alternate with ibuprofen, Disp: 60 tablet, Rfl: 0  •  BYSTOLIC 10 MG tablet, Take 1 tablet by mouth Daily., Disp: 30 tablet, Rfl: 0  •  Cholecalciferol (VITAMIN D3 PO), Take 2,000 Units by mouth Daily., Disp: , Rfl:   •  docusate sodium (Colace) 100 MG capsule, Take 1 capsule by mouth 2 (Two) Times a Day., Disp: 60 capsule, Rfl: 0  •  DULoxetine (CYMBALTA) 60 MG capsule, duloxetine 60 mg capsule,delayed release  TK 1 C PO BID, Disp: , Rfl:   •  ferrous sulfate 325 (65 FE) MG tablet, Take 1 tablet by mouth 2 (Two) Times a Day Before Meals., Disp: 60  tablet, Rfl: 0  •  hydroCHLOROthiazide (HYDRODIURIL) 25 MG tablet, Take 25 mg by mouth Daily., Disp: , Rfl:   •  ibuprofen (ADVIL,MOTRIN) 800 MG tablet, Take 1 tablet by mouth Every 8 (Eight) Hours As Needed for Mild Pain ., Disp: 40 tablet, Rfl: 0  •  Janumet XR  MG tablet, Take 1 tablet by mouth 2 (two) times a day., Disp: , Rfl:   •  levothyroxine (SYNTHROID, LEVOTHROID) 200 MCG tablet, Take 1 tablet by mouth Daily. (Patient taking differently: Take 137 mcg by mouth Daily.), Disp: 30 tablet, Rfl: 11  •  lisinopril (PRINIVIL,ZESTRIL) 30 MG tablet, Take 30 mg by mouth Daily., Disp: , Rfl:   •  LORazepam (ATIVAN) 0.5 MG tablet, Take 0.5 mg by mouth 2 (Two) Times a Day As Needed., Disp: , Rfl: 0  •  temazepam (RESTORIL) 15 MG capsule, Take 1 capsule by mouth At Night As Needed for Sleep., Disp: 30 capsule, Rfl: 3  •  oxyCODONE (Roxicodone) 5 MG immediate release tablet, Take 1 tablet by mouth Every 4 (Four) Hours As Needed for Moderate Pain ., Disp: 18 tablet, Rfl: 0  •  ramelteon (ROZEREM) 8 MG tablet, Take 1 tablet by mouth Every Night., Disp: 30 tablet, Rfl: 3  •  tiZANidine (ZANAFLEX) 4 MG tablet, Take 4 mg by mouth At Night As Needed for Muscle Spasms., Disp: , Rfl:    No Known Allergies   Past Surgical History:   Procedure Laterality Date   • CARPAL TUNNEL RELEASE Bilateral    •  SECTION      times three   • CHOLECYSTECTOMY     • COLONOSCOPY     • CYSTOSCOPY N/A 3/5/2021    Procedure: CYSTOSCOPY;  Surgeon: Liz Mendoza MD;  Location: [facility];  Service: Obstetrics/Gynecology;  Laterality: N/A;   • D&C HYSTEROSCOPY ENDOMETRIAL ABLATION Left 10/14/2020    Procedure: DILATATION AND CURETTAGE HYSTEROSCOPY;  Surgeon: Liz Mendoza MD;  Location: [facility];  Service: Obstetrics/Gynecology;  Laterality: Left;  Patient states that they were unable to do the ablation due to an abnormality with her uterus that was found during surgery.   • ELBOW ARTHROPLASTY Left    • ENDOSCOPY     • LAPAROSCOPIC  "ASSISTED VAGINAL HYSTERECTOMY SALPINGO OOPHORECTOMY N/A 3/5/2021    Procedure: LAPAROSCOPIC ASSISTED VAGINAL HYSTERECTOMY BILATERAL SALPINGO OOPHORECTOMY;  Surgeon: Liz Mendoza MD;  Location: [facility];  Service: Obstetrics/Gynecology;  Laterality: N/A;   • SKIN BIOPSY     • WISDOM TOOTH EXTRACTION        Social History     Socioeconomic History   • Marital status:      Spouse name: Not on file   • Number of children: Not on file   • Years of education: Not on file   • Highest education level: Not on file   Tobacco Use   • Smoking status: Never Smoker   • Smokeless tobacco: Never Used   Vaping Use   • Vaping Use: Never used   Substance and Sexual Activity   • Alcohol use: No   • Drug use: No   • Sexual activity: Yes     Partners: Male     Birth control/protection: None     Comment: vasectomy      Family History   Problem Relation Age of Onset   • Arthritis Other    • Cancer Other    • Thyroid disease Other    • Diabetes Other    • Mental illness Other    • Heart attack Other    • Arthritis Mother    • Cancer Mother    • Thyroid disease Mother    • Diabetes Father    • Mental illness Father    • Mental illness Sister    • Heart attack Other    • Cancer Other    • Cancer Other    • Diabetes Daughter        Review of Systems   Constitutional: Negative for chills, diaphoresis and fever.   Gastrointestinal: Negative for constipation, diarrhea, nausea and vomiting.   Genitourinary: Negative for difficulty urinating, dysuria, vaginal bleeding and vaginal discharge.           Objective   /80   Ht 162.6 cm (64\")   Wt 108 kg (238 lb 9.6 oz)   LMP 01/12/2021   Breastfeeding No   BMI 40.96 kg/m²     Physical Exam  Constitutional:       Appearance: Normal appearance. She is well-developed and well-groomed.   Abdominal:      Palpations: Abdomen is soft.      Tenderness: There is no abdominal tenderness.      Comments: Incisions healing well   Neurological:      General: No focal deficit present.      Mental " Status: She is alert and oriented to person, place, and time.   Psychiatric:         Attention and Perception: Attention normal.         Mood and Affect: Mood normal.         Speech: Speech normal.         Behavior: Behavior is cooperative.         Thought Content: Thought content normal.              Assessment and Plan  Diagnoses and all orders for this visit:    1. Postoperative follow-up (Primary)      Patient Instructions   Continue light activity  No driving until 2 weeks post op  Follow up 5 weeks or prn             This note was electronically signed.    Jessica Webb PA-C   March 10, 2021

## 2021-03-16 ENCOUNTER — OFFICE VISIT (OUTPATIENT)
Dept: OBSTETRICS AND GYNECOLOGY | Facility: CLINIC | Age: 46
End: 2021-03-16

## 2021-03-16 VITALS
BODY MASS INDEX: 40.63 KG/M2 | DIASTOLIC BLOOD PRESSURE: 80 MMHG | SYSTOLIC BLOOD PRESSURE: 134 MMHG | HEIGHT: 64 IN | WEIGHT: 238 LBS

## 2021-03-16 DIAGNOSIS — Z48.02 VISIT FOR SUTURE REMOVAL: ICD-10-CM

## 2021-03-16 DIAGNOSIS — Z09 POSTOPERATIVE FOLLOW-UP: Primary | ICD-10-CM

## 2021-03-16 PROCEDURE — 99024 POSTOP FOLLOW-UP VISIT: CPT | Performed by: PHYSICIAN ASSISTANT

## 2021-03-16 NOTE — PROGRESS NOTES
Subjective   Chief Complaint   Patient presents with   • Post-op     11 days post-op LAVH-BSO, incision check.  Patient noticed a stitch in belly button area.  A piece of suture located and removed       Debi Munoz is a 45 y.o. year old  presenting to be seen for stitch removal  12 days post op LAVH BSO. Patient noticed stitch left deep in belly button.  No complaints. Still doing well post op     Past Medical History:   Diagnosis Date   • Anxiety    • Bell's palsy    • Bipolar disorder (CMS/HCC)    • COVID-19 2021   • Depression    • Diabetes mellitus (CMS/HCC) 2020   • Diarrhea    • Disease of thyroid gland    • Elevated cholesterol    • Fibromyalgia    • Gallstones    • Gestational diabetes    • H/O exercise stress test    • Hypertension    • Infection of kidney    • Migraine headache    • JC (nonalcoholic steatohepatitis)    • Osteoarthritis    • Ovarian cyst    • Pneumonia    • PONV (postoperative nausea and vomiting)    • Sleep apnea     CPAP,instructed to bring DOS.   • Spinal headache    • Wears glasses         Current Outpatient Medications:   •  Accu-Chek Guide test strip, Daily., Disp: , Rfl:   •  acetaminophen (TYLENOL) 500 MG tablet, Take 2 tablets by mouth Every 8 (Eight) Hours As Needed for Mild or Moderate Pain ., Disp: 60 tablet, Rfl: 0  •  acetaminophen (TYLENOL) 500 MG tablet, Take 2 tablets by mouth Every 8 (Eight) Hours. Alternate with ibuprofen, Disp: 60 tablet, Rfl: 0  •  BYSTOLIC 10 MG tablet, Take 1 tablet by mouth Daily., Disp: 30 tablet, Rfl: 0  •  Cholecalciferol (VITAMIN D3 PO), Take 2,000 Units by mouth Daily., Disp: , Rfl:   •  docusate sodium (Colace) 100 MG capsule, Take 1 capsule by mouth 2 (Two) Times a Day., Disp: 60 capsule, Rfl: 0  •  DULoxetine (CYMBALTA) 60 MG capsule, duloxetine 60 mg capsule,delayed release  TK 1 C PO BID, Disp: , Rfl:   •  ferrous sulfate 325 (65 FE) MG tablet, Take 1 tablet by mouth 2 (Two) Times a Day Before Meals., Disp: 60  tablet, Rfl: 0  •  hydroCHLOROthiazide (HYDRODIURIL) 25 MG tablet, Take 25 mg by mouth Daily., Disp: , Rfl:   •  ibuprofen (ADVIL,MOTRIN) 800 MG tablet, Take 1 tablet by mouth Every 8 (Eight) Hours As Needed for Mild Pain ., Disp: 40 tablet, Rfl: 0  •  Janumet XR  MG tablet, Take 1 tablet by mouth 2 (two) times a day., Disp: , Rfl:   •  lisinopril (PRINIVIL,ZESTRIL) 30 MG tablet, Take 30 mg by mouth Daily., Disp: , Rfl:   •  LORazepam (ATIVAN) 0.5 MG tablet, Take 0.5 mg by mouth 2 (Two) Times a Day As Needed., Disp: , Rfl: 0  •  oxyCODONE (Roxicodone) 5 MG immediate release tablet, Take 1 tablet by mouth Every 4 (Four) Hours As Needed for Moderate Pain ., Disp: 18 tablet, Rfl: 0  •  temazepam (RESTORIL) 15 MG capsule, Take 1 capsule by mouth At Night As Needed for Sleep., Disp: 30 capsule, Rfl: 3  •  tiZANidine (ZANAFLEX) 4 MG tablet, Take 4 mg by mouth At Night As Needed for Muscle Spasms., Disp: , Rfl:   •  levothyroxine (SYNTHROID, LEVOTHROID) 200 MCG tablet, Take 1 tablet by mouth Daily. (Patient taking differently: Take 137 mcg by mouth Daily.), Disp: 30 tablet, Rfl: 11  •  ramelteon (ROZEREM) 8 MG tablet, Take 1 tablet by mouth Every Night., Disp: 30 tablet, Rfl: 3   No Known Allergies   Past Surgical History:   Procedure Laterality Date   • CARPAL TUNNEL RELEASE Bilateral    •  SECTION      times three   • CHOLECYSTECTOMY     • COLONOSCOPY     • CYSTOSCOPY N/A 3/5/2021    Procedure: CYSTOSCOPY;  Surgeon: Liz Mendoza MD;  Location: [facility];  Service: Obstetrics/Gynecology;  Laterality: N/A;   • D & C HYSTEROSCOPY ENDOMETRIAL ABLATION Left 10/14/2020    Procedure: DILATATION AND CURETTAGE HYSTEROSCOPY;  Surgeon: Liz Mendoza MD;  Location: [facility];  Service: Obstetrics/Gynecology;  Laterality: Left;  Patient states that they were unable to do the ablation due to an abnormality with her uterus that was found during surgery.   • ELBOW ARTHROPLASTY Left    • ENDOSCOPY     • LAPAROSCOPIC  "ASSISTED VAGINAL HYSTERECTOMY SALPINGO OOPHORECTOMY N/A 3/5/2021    Procedure: LAPAROSCOPIC ASSISTED VAGINAL HYSTERECTOMY BILATERAL SALPINGO OOPHORECTOMY;  Surgeon: Liz Mendoza MD;  Location: [facility];  Service: Obstetrics/Gynecology;  Laterality: N/A;   • SKIN BIOPSY     • WISDOM TOOTH EXTRACTION        Social History     Socioeconomic History   • Marital status:      Spouse name: Not on file   • Number of children: Not on file   • Years of education: Not on file   • Highest education level: Not on file   Tobacco Use   • Smoking status: Never Smoker   • Smokeless tobacco: Never Used   Vaping Use   • Vaping Use: Never used   Substance and Sexual Activity   • Alcohol use: No   • Drug use: No   • Sexual activity: Yes     Partners: Male     Birth control/protection: None     Comment: vasectomy      Family History   Problem Relation Age of Onset   • Arthritis Other    • Cancer Other    • Thyroid disease Other    • Diabetes Other    • Mental illness Other    • Heart attack Other    • Arthritis Mother    • Cancer Mother    • Thyroid disease Mother    • Diabetes Father    • Mental illness Father    • Mental illness Sister    • Heart attack Other    • Cancer Other    • Cancer Other    • Diabetes Daughter        Review of Systems   Constitutional: Negative.    Gastrointestinal: Negative.    Genitourinary: Negative.            Objective   /80   Ht 162.6 cm (64\")   Wt 108 kg (238 lb)   LMP 01/12/2021   Breastfeeding No   BMI 40.85 kg/m²     Physical Exam  Constitutional:       Appearance: Normal appearance. She is well-developed.   Abdominal:      Palpations: Abdomen is soft.      Tenderness: There is no abdominal tenderness.      Comments: Umbilical incision Incision healing well   Neurological:      General: No focal deficit present.      Mental Status: She is alert and oriented to person, place, and time.   Psychiatric:         Attention and Perception: Attention normal.         Mood and Affect: Mood " normal.         Speech: Speech normal.         Behavior: Behavior is cooperative.         Thought Content: Thought content normal.              Assessment and Plan  Diagnoses and all orders for this visit:    1. Postoperative follow-up (Primary)    2. Visit for suture removal      Patient Instructions   Keep scheduled 6 week post op appt              This note was electronically signed.    Jessica Webb PA-C   March 16, 2021

## 2021-03-29 ENCOUNTER — IMMUNIZATION (OUTPATIENT)
Dept: VACCINE CLINIC | Facility: HOSPITAL | Age: 46
End: 2021-03-29

## 2021-03-29 PROCEDURE — 0001A: CPT | Performed by: INTERNAL MEDICINE

## 2021-03-29 PROCEDURE — 91300 HC SARSCOV02 VAC 30MCG/0.3ML IM: CPT | Performed by: INTERNAL MEDICINE

## 2021-04-14 ENCOUNTER — OFFICE VISIT (OUTPATIENT)
Dept: OBSTETRICS AND GYNECOLOGY | Facility: CLINIC | Age: 46
End: 2021-04-14

## 2021-04-14 VITALS
DIASTOLIC BLOOD PRESSURE: 84 MMHG | HEIGHT: 64 IN | SYSTOLIC BLOOD PRESSURE: 134 MMHG | BODY MASS INDEX: 39.61 KG/M2 | WEIGHT: 232 LBS

## 2021-04-14 DIAGNOSIS — N95.2 POSTMENOPAUSAL ATROPHIC VAGINITIS: ICD-10-CM

## 2021-04-14 DIAGNOSIS — Z09 POSTOPERATIVE FOLLOW-UP: Primary | ICD-10-CM

## 2021-04-14 DIAGNOSIS — D62 ANEMIA DUE TO ACUTE BLOOD LOSS: ICD-10-CM

## 2021-04-14 PROCEDURE — 99024 POSTOP FOLLOW-UP VISIT: CPT | Performed by: OBSTETRICS & GYNECOLOGY

## 2021-04-14 RX ORDER — ACETAMINOPHEN 160 MG
2000 TABLET,DISINTEGRATING ORAL DAILY
COMMUNITY
Start: 2021-03-28

## 2021-04-14 RX ORDER — ESTRADIOL 0.1 MG/G
CREAM VAGINAL
Qty: 1 EACH | Refills: 11 | Status: SHIPPED | OUTPATIENT
Start: 2021-04-14

## 2021-04-15 NOTE — PROGRESS NOTES
"Chief Complaint  Post-op Follow-up (5 week 5 days post op LAVH BSO, MARJORIE, Cystoscopy. )     History of Present Illness:  Patient is 45 y.o.  who presents to [facility] for her 6 weeks postoperative visit.  Patient reports she has been doing well.  Patient has been doing light activities only.  Patient has not had any recent spotting or bleeding.  Patient does have at times intermittent pain and discomfort.  Patient denies any fever or chills.  Patient reports at times she will have hot flashes and feel flushed.  Patient reports she has done this in the past as well with her anxiety.  She reports no major changes in her symptoms.  Patient reports her symptoms are tolerable.  Patient reports she is having normal bowel movements as well as urination.  Patient has continued on her iron supplements.    Physical Examination:  Vital Signs: /84   Ht 162.6 cm (64\")   Wt 105 kg (232 lb)   BMI 39.82 kg/m²     General Appearance: alert, appears stated age, and cooperative  Breasts: Not performed.  Abdomen: no masses, no hepatomegaly, no splenomegaly, soft non-tender, no guarding and no rebound tenderness  Pelvic: Clinical staff was present for exam  External genitalia:  normal appearance of the external genitalia including Bartholin's and [place]'s glands.  :  urethral meatus normal;  Vaginal:  atrophic mucosal changes are present;  Cervix:  absent.  Uterus:  absent.  Adnexa:  non palpable bilaterally.    Data Review:  The following data was reviewed by: Liz Mendoza MD on 2021:     Labs:  CBC and Differential (2021 14:31)  CBC (No Diff) (2021 05:33)    Imaging:    Medical Records:  None    Assessment and Plan   Problem List Items Addressed This Visit     None      Visit Diagnoses     Postoperative follow-up    -  Primary  Patient with normal postoperative examination.  Instructions and precautions have been given regarding activities and follow-up.  Patient is to " continue pelvic rest for 2 more weeks.  Prescription is given for estrogen cream.  Patient may start this at this time.  Patient will return in 6 weeks for laboratory assessment as well.  She is to continue her iron supplements at this time.    Atrophic vaginitis  Patient with atrophic changes as noted.  Prescription is given for estrogen cream.  Instructions and precautions have been given.  Anemia due to acute blood loss      Patient is to continue her iron supplements at this time.  She will follow-up in 6 weeks for laboratory assessment as discussed.  Plan pending results.    Relevant Orders    CBC (No Diff)          Follow Up/Instructions:  Follow up as noted.  Patient was given instructions and counseling regarding her condition or for health maintenance advice. Please see specific information pulled into the AVS if appropriate.     Note: Speech recognition transcription software may have been used to dictate portions of this document.  An attempt at proofreading has been made though minor errors in transcription may still be present.    This note was electronically signed.  Liz Mendoza M.D.

## 2021-04-20 ENCOUNTER — IMMUNIZATION (OUTPATIENT)
Dept: VACCINE CLINIC | Facility: HOSPITAL | Age: 46
End: 2021-04-20

## 2021-04-20 PROCEDURE — 0002A: CPT | Performed by: INTERNAL MEDICINE

## 2021-04-20 PROCEDURE — 91300 HC SARSCOV02 VAC 30MCG/0.3ML IM: CPT | Performed by: INTERNAL MEDICINE

## 2021-07-27 ENCOUNTER — OFFICE VISIT (OUTPATIENT)
Dept: PULMONOLOGY | Facility: CLINIC | Age: 46
End: 2021-07-27

## 2021-07-27 VITALS
OXYGEN SATURATION: 99 % | HEIGHT: 64 IN | BODY MASS INDEX: 38.76 KG/M2 | DIASTOLIC BLOOD PRESSURE: 74 MMHG | RESPIRATION RATE: 16 BRPM | WEIGHT: 227 LBS | SYSTOLIC BLOOD PRESSURE: 122 MMHG | HEART RATE: 77 BPM

## 2021-07-27 DIAGNOSIS — Z72.821 POOR SLEEP HYGIENE: ICD-10-CM

## 2021-07-27 DIAGNOSIS — E66.01 MORBID OBESITY, UNSPECIFIED OBESITY TYPE (HCC): ICD-10-CM

## 2021-07-27 DIAGNOSIS — G47.33 OSA (OBSTRUCTIVE SLEEP APNEA): Primary | ICD-10-CM

## 2021-07-27 DIAGNOSIS — G47.00 INSOMNIA, UNSPECIFIED TYPE: ICD-10-CM

## 2021-07-27 PROCEDURE — 99214 OFFICE O/P EST MOD 30 MIN: CPT | Performed by: INTERNAL MEDICINE

## 2021-07-27 RX ORDER — TEMAZEPAM 15 MG/1
15 CAPSULE ORAL NIGHTLY PRN
Qty: 30 CAPSULE | Refills: 3 | Status: SHIPPED | OUTPATIENT
Start: 2021-07-27 | End: 2021-10-28

## 2021-07-27 RX ORDER — LEVOTHYROXINE SODIUM 137 UG/1
137 TABLET ORAL DAILY
COMMUNITY
Start: 2021-06-03

## 2021-10-28 ENCOUNTER — OFFICE VISIT (OUTPATIENT)
Dept: PULMONOLOGY | Facility: CLINIC | Age: 46
End: 2021-10-28

## 2021-10-28 VITALS
BODY MASS INDEX: 39.09 KG/M2 | OXYGEN SATURATION: 98 % | HEIGHT: 64 IN | HEART RATE: 87 BPM | WEIGHT: 229 LBS | SYSTOLIC BLOOD PRESSURE: 122 MMHG | RESPIRATION RATE: 16 BRPM | DIASTOLIC BLOOD PRESSURE: 78 MMHG

## 2021-10-28 DIAGNOSIS — G47.00 INSOMNIA, UNSPECIFIED TYPE: ICD-10-CM

## 2021-10-28 DIAGNOSIS — G47.33 OSA (OBSTRUCTIVE SLEEP APNEA): Primary | ICD-10-CM

## 2021-10-28 DIAGNOSIS — Z72.821 POOR SLEEP HYGIENE: ICD-10-CM

## 2021-10-28 DIAGNOSIS — E66.01 MORBID OBESITY, UNSPECIFIED OBESITY TYPE (HCC): ICD-10-CM

## 2021-10-28 PROCEDURE — 99213 OFFICE O/P EST LOW 20 MIN: CPT | Performed by: NURSE PRACTITIONER

## 2021-10-28 RX ORDER — BACLOFEN 10 MG/1
10 TABLET ORAL AS NEEDED
COMMUNITY

## 2021-10-28 RX ORDER — TEMAZEPAM 30 MG/1
30 CAPSULE ORAL NIGHTLY PRN
Qty: 30 CAPSULE | Refills: 2 | Status: SHIPPED | OUTPATIENT
Start: 2021-10-28 | End: 2022-01-27 | Stop reason: SDUPTHER

## 2022-01-27 ENCOUNTER — TRANSCRIBE ORDERS (OUTPATIENT)
Dept: ADMINISTRATIVE | Facility: HOSPITAL | Age: 47
End: 2022-01-27

## 2022-01-27 ENCOUNTER — OFFICE VISIT (OUTPATIENT)
Dept: PULMONOLOGY | Facility: CLINIC | Age: 47
End: 2022-01-27

## 2022-01-27 VITALS
BODY MASS INDEX: 40.63 KG/M2 | HEART RATE: 85 BPM | OXYGEN SATURATION: 98 % | SYSTOLIC BLOOD PRESSURE: 128 MMHG | HEIGHT: 64 IN | WEIGHT: 238 LBS | RESPIRATION RATE: 16 BRPM | DIASTOLIC BLOOD PRESSURE: 76 MMHG

## 2022-01-27 DIAGNOSIS — G47.00 INSOMNIA, UNSPECIFIED TYPE: ICD-10-CM

## 2022-01-27 DIAGNOSIS — E66.01 MORBID OBESITY, UNSPECIFIED OBESITY TYPE: ICD-10-CM

## 2022-01-27 DIAGNOSIS — G47.33 OSA (OBSTRUCTIVE SLEEP APNEA): Primary | ICD-10-CM

## 2022-01-27 PROCEDURE — 99214 OFFICE O/P EST MOD 30 MIN: CPT | Performed by: INTERNAL MEDICINE

## 2022-01-27 RX ORDER — TEMAZEPAM 30 MG/1
30 CAPSULE ORAL NIGHTLY PRN
Qty: 30 CAPSULE | Refills: 2 | Status: SHIPPED | OUTPATIENT
Start: 2022-02-07 | End: 2022-05-23 | Stop reason: SDUPTHER

## 2022-01-27 NOTE — PROGRESS NOTES
"  Chief Complaint   Patient presents with   • Follow-up   • Sleeping Problem       Subjective   Debi Munoz is a 46 y.o. female.     History of Present Illness   Patient was evaluated today for follow up of Sleep apnea and insomnia.     Patient doesn't report any issues with the PAP device. The patient describes no significant issues with her mask either.     Patient says that the compliance with the use of the equipment is good.     Patient says that her symptoms of fatigue & daytime sleepiness remain persistent although she seems to be sleeping heavier with the medicine.     Ambien caused her to have \"automatic behaviors\".       The following portions of the patient's history were reviewed and updated as appropriate: allergies, current medications, past family history, past medical history, past social history and past surgical history.    Review of Systems   HENT: Negative for sinus pressure, sneezing and sore throat.    Respiratory: Negative for cough, chest tightness, shortness of breath and wheezing.        Objective   Visit Vitals  /76   Pulse 85   Resp 16   Ht 162.6 cm (64.02\")   Wt 108 kg (238 lb)   LMP 01/12/2021   SpO2 98%   BMI 40.83 kg/m²       Physical Exam  Vitals reviewed.   Constitutional:       Appearance: She is well-developed.   HENT:      Head: Atraumatic.      Mouth/Throat:      Comments: Oropharynx was crowded.  Neck:      Comments: Increased adipose tissue noted.  Musculoskeletal:      Comments: Gait was normal.   Neurological:      Mental Status: She is alert and oriented to person, place, and time.         Assessment/Plan   Diagnoses and all orders for this visit:    1. ERNA (obstructive sleep apnea) (Primary)    2. Insomnia, unspecified type  -     temazepam (RESTORIL) 30 MG capsule; Take 1 capsule by mouth At Night As Needed for Sleep.  Dispense: 30 capsule; Refill: 2    3. Morbid obesity, unspecified obesity type (HCC)         Return in about 4 months (around 5/27/2022) for " SleepONLY/Mackenzie, ...Also 8 mths w/Mackenzie.    DISCUSSION (if any):  Continue treatment with CPAP at a pressure of 10, with a nasal mask.    Patient seems to be compliant with PAP device, based on the available data and her account of improved symptoms.     Compliance data was obtained from the her device/DME company.    Sleep hygiene measures were discussed in great detail including need to follow a strict bedtime and to avoid electronic devices in bed and close to bedtime.    she was also asked to avoid caffeinated or alcoholic beverages within 4 to 6 hours of bedtime.    Weight loss advised.    The patient was once again reminded to continue using the PAP device regularly, every night for atleast 4 hours.    Sleep hygiene measures were discussed with the patient in great detail.    The patient was told that she will need to follow a strict time to go to bed as well as a fixed time to get out of bed.    Multiple measures were discussed including sleep restriction and she was strongly encouraged to follow other recommendations including avoiding naps, watching TV in the bed etc.    Due to a fair response to the current therapy, we will continue her on Restoril.     She will be asked to take Melatonin 3 mg around 8 PM every night.    One option would be to consider Belsomra.     Unfortunately, her insurance denied Rozerem.    Side effects of prescribed medication were discussed.    Arturo was reviewed.      Dictated utilizing Dragon dictation.    This document was electronically signed by Afshan Smith MD on 01/27/22 at 11:21 EST

## 2022-02-01 ENCOUNTER — TRANSCRIBE ORDERS (OUTPATIENT)
Dept: ADMINISTRATIVE | Facility: HOSPITAL | Age: 47
End: 2022-02-01

## 2022-02-01 DIAGNOSIS — N64.9 DISORDER OF BREAST, UNSPECIFIED: Primary | ICD-10-CM

## 2022-02-09 ENCOUNTER — HOSPITAL ENCOUNTER (OUTPATIENT)
Dept: MAMMOGRAPHY | Facility: HOSPITAL | Age: 47
Discharge: HOME | End: 2022-02-09
Admitting: FAMILY MEDICINE

## 2022-02-09 DIAGNOSIS — N64.9 DISORDER OF BREAST, UNSPECIFIED: ICD-10-CM

## 2022-02-09 PROCEDURE — G0279 TOMOSYNTHESIS, MAMMO: HCPCS

## 2022-02-09 PROCEDURE — 77066 DX MAMMO INCL CAD BI: CPT

## 2022-04-15 ENCOUNTER — OFFICE VISIT (OUTPATIENT)
Dept: UROLOGY | Facility: CLINIC | Age: 47
End: 2022-04-15

## 2022-04-15 VITALS — BODY MASS INDEX: 40.63 KG/M2 | HEIGHT: 64 IN | WEIGHT: 238 LBS

## 2022-04-15 DIAGNOSIS — R31.29 MICROSCOPIC HEMATURIA: Primary | ICD-10-CM

## 2022-04-15 LAB
BILIRUB BLD-MCNC: ABNORMAL MG/DL
CLARITY, POC: CLEAR
COLOR UR: ABNORMAL
EXPIRATION DATE: ABNORMAL
GLUCOSE UR STRIP-MCNC: NEGATIVE MG/DL
KETONES UR QL: NEGATIVE
LEUKOCYTE EST, POC: ABNORMAL
Lab: ABNORMAL
NITRITE UR-MCNC: NEGATIVE MG/ML
PH UR: 5.5 [PH] (ref 5–8)
PROT UR STRIP-MCNC: ABNORMAL MG/DL
RBC # UR STRIP: ABNORMAL /UL
SP GR UR: 1.03 (ref 1–1.03)
UROBILINOGEN UR QL: NORMAL

## 2022-04-15 PROCEDURE — 81003 URINALYSIS AUTO W/O SCOPE: CPT | Performed by: PHYSICIAN ASSISTANT

## 2022-04-15 PROCEDURE — 99203 OFFICE O/P NEW LOW 30 MIN: CPT | Performed by: PHYSICIAN ASSISTANT

## 2022-04-15 NOTE — PROGRESS NOTES
Chief Complaint  Gross hematuria    HPI  Ms. Munoz is a 46 y.o. female with history of total hysterectomy in  due to dysmenorrhagia who presents with gross hematuria. Started after hysterectomy, pink on toilet paper with wiping, never present in the toilet bowl. Denies associated burning or pain with urination.     Patient does have current hematuria.    History of smoking?    no  History of second-hand smoking exposure? yes, father smoked through childhood  History of chemotherapy?   no  History of radiation?    no  History of kidney or bladder stones?  no  History of frequent urinary tract infections? no  History of abnormal pelvic exam or pap smear? no  Takes blood thinners?    no    History of menopause, vaginal bleeding/spotting (no).    Started having left sided back pain for about 1 month. Pain was constant for about 2 weeks, motrin and tylenol did not help. Had associated nausea and decreased appetite and hematuria. No changes in bowel. Pain slowly improved. Now notices its return when very physically active.     Occupational history: previously developed film/photos    Past Medical History  Past Medical History:   Diagnosis Date   • Anxiety    • Bell's palsy    • Bipolar disorder (HCC)    • COVID-19 2021   • Depression    • Diabetes mellitus (HCC) 2020   • Diarrhea    • Disease of thyroid gland    • Elevated cholesterol    • Fibromyalgia    • Gallstones    • Gestational diabetes    • H/O exercise stress test    • Hypertension    • Infection of kidney    • Migraine headache    • JC (nonalcoholic steatohepatitis)    • Osteoarthritis    • Ovarian cyst    • Pneumonia    • PONV (postoperative nausea and vomiting)    • Sleep apnea     CPAP,instructed to bring DOS.   • Spinal headache    • Wears glasses        Past Surgical History  Past Surgical History:   Procedure Laterality Date   • CARPAL TUNNEL RELEASE Bilateral    •  SECTION      times three   • CHOLECYSTECTOMY     • COLONOSCOPY      • CYSTOSCOPY N/A 3/5/2021    Procedure: CYSTOSCOPY;  Surgeon: Liz Mendoza MD;  Location: [facility];  Service: Obstetrics/Gynecology;  Laterality: N/A;   • D & C HYSTEROSCOPY ENDOMETRIAL ABLATION Left 10/14/2020    Procedure: DILATATION AND CURETTAGE HYSTEROSCOPY;  Surgeon: Liz Mendoza MD;  Location: [facility];  Service: Obstetrics/Gynecology;  Laterality: Left;  Patient states that they were unable to do the ablation due to an abnormality with her uterus that was found during surgery.   • ELBOW ARTHROPLASTY Left    • ENDOSCOPY     • LAPAROSCOPIC ASSISTED VAGINAL HYSTERECTOMY SALPINGO OOPHORECTOMY N/A 3/5/2021    Procedure: LAPAROSCOPIC ASSISTED VAGINAL HYSTERECTOMY BILATERAL SALPINGO OOPHORECTOMY;  Surgeon: Liz Mendoza MD;  Location: [facility];  Service: Obstetrics/Gynecology;  Laterality: N/A;   • SKIN BIOPSY     • WISDOM TOOTH EXTRACTION         Medications    Current Outpatient Medications:   •  Accu-Chek Guide test strip, Daily., Disp: , Rfl:   •  acetaminophen (TYLENOL) 500 MG tablet, Take 2 tablets by mouth Every 8 (Eight) Hours As Needed for Mild or Moderate Pain ., Disp: 60 tablet, Rfl: 0  •  acetaminophen (TYLENOL) 500 MG tablet, Take 2 tablets by mouth Every 8 (Eight) Hours. Alternate with ibuprofen, Disp: 60 tablet, Rfl: 0  •  baclofen (LIORESAL) 10 MG tablet, Take 10 mg by mouth 3 (Three) Times a Day., Disp: , Rfl:   •  BYSTOLIC 10 MG tablet, Take 1 tablet by mouth Daily., Disp: 30 tablet, Rfl: 0  •  Cholecalciferol (Vitamin D3) 50 MCG (2000 UT) capsule, Take 2,000 Units by mouth Daily., Disp: , Rfl:   •  docusate sodium (Colace) 100 MG capsule, Take 1 capsule by mouth 2 (Two) Times a Day., Disp: 60 capsule, Rfl: 0  •  DULoxetine (CYMBALTA) 60 MG capsule, duloxetine 60 mg capsule,delayed release  TK 1 C PO BID, Disp: , Rfl:   •  estradiol (ESTRACE VAGINAL) 0.1 MG/GM vaginal cream, Use 0.5 grams intravaginally twice weekly to control symptoms., Disp: 1 each, Rfl: 11  •  ferrous sulfate 325 (65 FE)  "MG tablet, Take 1 tablet by mouth 2 (Two) Times a Day Before Meals., Disp: 60 tablet, Rfl: 0  •  hydroCHLOROthiazide (HYDRODIURIL) 25 MG tablet, Take 25 mg by mouth Daily., Disp: , Rfl:   •  ibuprofen (ADVIL,MOTRIN) 800 MG tablet, Take 1 tablet by mouth Every 8 (Eight) Hours As Needed for Mild Pain ., Disp: 40 tablet, Rfl: 0  •  Janumet XR  MG tablet, Take 1 tablet by mouth 2 (two) times a day., Disp: , Rfl:   •  levothyroxine (SYNTHROID, LEVOTHROID) 137 MCG tablet, Take 137 mcg by mouth Daily., Disp: , Rfl:   •  lisinopril (PRINIVIL,ZESTRIL) 30 MG tablet, Take 30 mg by mouth Daily., Disp: , Rfl:   •  LORazepam (ATIVAN) 0.5 MG tablet, Take 0.5 mg by mouth 2 (Two) Times a Day As Needed., Disp: , Rfl: 0  •  oxyCODONE (Roxicodone) 5 MG immediate release tablet, Take 1 tablet by mouth Every 4 (Four) Hours As Needed for Moderate Pain ., Disp: 18 tablet, Rfl: 0  •  temazepam (RESTORIL) 30 MG capsule, Take 1 capsule by mouth At Night As Needed for Sleep., Disp: 30 capsule, Rfl: 2  •  tiZANidine (ZANAFLEX) 4 MG tablet, Take 4 mg by mouth At Night As Needed for Muscle Spasms., Disp: , Rfl:     Allergies  No Known Allergies    Social History  Social History     Socioeconomic History   • Marital status:    Tobacco Use   • Smoking status: Never Smoker   • Smokeless tobacco: Never Used   Vaping Use   • Vaping Use: Never used   Substance and Sexual Activity   • Alcohol use: No   • Drug use: No   • Sexual activity: Yes     Partners: Male     Birth control/protection: None     Comment: vasectomy       Family History  She has no family history of bladder or kidney cancer  She has FH of stones in her sister      Physical Exam  Visit Vitals  Ht 162.6 cm (64.02\")   Wt 108 kg (238 lb)   LMP 01/12/2021   BMI 40.83 kg/m²         Labs  Brief Urine Lab Results  (Last result in the past 365 days)      Color   Clarity   Blood   Leuk Est   Nitrite   Protein   CREAT   Urine HCG        04/15/22 1206 Dark Yellow   Clear   2+   " Trace   Negative   1+                   Chemistry        Component Value Date/Time     03/02/2021 1431    K 3.4 (L) 03/02/2021 1431     03/02/2021 1431    CO2 25.1 03/02/2021 1431    BUN 16 03/02/2021 1431    CREATININE 0.96 03/02/2021 1431        Component Value Date/Time    CALCIUM 9.0 03/02/2021 1431    ALKPHOS 49 08/14/2020 1238    AST 57 (H) 08/14/2020 1238    ALT 60 (H) 08/14/2020 1238    BILITOT 0.4 08/14/2020 1238            Lab Results   Component Value Date    WBC 10.51 03/06/2021    HGB 7.9 (L) 03/06/2021    HCT 26.9 (L) 03/06/2021    MCV 72.3 (L) 03/06/2021     03/06/2021         Assessment  46 y.o. female who presents with gross hematuria.  Risk factors include presence of gross hematuria.  In order to complete the hematuria work up we need to perform a flexible cystoscopy and acquire upper tract imaging.    Plan  1.  We discussed the indications for diagnostic flexible cystoscopy to be performed at the next clinic visit.  2.  Obtain CT Urogram  3. Obtain urine micro and culture       Jaclyn Lowry PA-C

## 2022-04-17 LAB
APPEARANCE UR: ABNORMAL
BACTERIA #/AREA URNS HPF: ABNORMAL /HPF
BACTERIA UR CULT: NORMAL
BACTERIA UR CULT: NORMAL
BILIRUB UR QL STRIP: NEGATIVE
CASTS URNS MICRO: ABNORMAL
COLOR UR: ABNORMAL
CRYSTALS URNS MICRO: ABNORMAL
EPI CELLS #/AREA URNS HPF: ABNORMAL /HPF
GLUCOSE UR QL STRIP: NEGATIVE
HGB UR QL STRIP: ABNORMAL
KETONES UR QL STRIP: ABNORMAL
LEUKOCYTE ESTERASE UR QL STRIP: ABNORMAL
NITRITE UR QL STRIP: NEGATIVE
PH UR STRIP: <=5 [PH] (ref 5–8)
PROT UR QL STRIP: ABNORMAL
RBC #/AREA URNS HPF: ABNORMAL /HPF
SP GR UR STRIP: ABNORMAL (ref 1–1.03)
UROBILINOGEN UR STRIP-MCNC: ABNORMAL MG/DL
WBC #/AREA URNS HPF: ABNORMAL /HPF

## 2022-04-25 ENCOUNTER — HOSPITAL ENCOUNTER (OUTPATIENT)
Dept: CT IMAGING | Facility: HOSPITAL | Age: 47
Discharge: HOME | End: 2022-04-25
Admitting: PHYSICIAN ASSISTANT

## 2022-04-25 DIAGNOSIS — R31.29 MICROSCOPIC HEMATURIA: ICD-10-CM

## 2022-04-25 PROCEDURE — 74178 CT ABD&PLV WO CNTR FLWD CNTR: CPT

## 2022-04-25 PROCEDURE — 25010000002 IOPAMIDOL 61 % SOLUTION: Performed by: PHYSICIAN ASSISTANT

## 2022-04-25 RX ADMIN — IOPAMIDOL 100 ML: 612 INJECTION, SOLUTION INTRAVENOUS at 18:35

## 2022-05-16 ENCOUNTER — PROCEDURE VISIT (OUTPATIENT)
Dept: UROLOGY | Facility: CLINIC | Age: 47
End: 2022-05-16

## 2022-05-16 DIAGNOSIS — R31.29 MICROSCOPIC HEMATURIA: Primary | ICD-10-CM

## 2022-05-16 PROCEDURE — 52000 CYSTOURETHROSCOPY: CPT | Performed by: UROLOGY

## 2022-05-16 NOTE — PROGRESS NOTES
Preprocedure diagnosis  Gross hematuria    Postprocedure diagnosis  Urethral irritation/GSM    Procedure  Flexible Cystourethroscopy    Attending surgeon  Brad Sanders MD    Anesthesia  2% lidocaine jelly intraurethrally    Complications  None    Indications  47 y.o. female undergoing a flexible cystoscopy for the above mentioned indications.    Informed consent was obtained.      Findings  Cystoscopy revealed one right and left ureteral orifice in the normal anatomic position, normal bladder mucosa and no tumors, masses or stones.      No stress incontinence on exam.  No pelvic organ prolapse.  Positive GSM with urethral irritation and bleeding.    Procedure  The patient was placed in supine position and prepped and draped in sterile fashion with lidocaine jelly per urethra for anesthesia.  A timeout was performed.  The 14F flexible cystoscope was lubricated and gently placed through the urethra and into the bladder.  The bladder was completely visualized.  The cystoscope was retroflexed and the bladder neck visualized.  The scope was withdrawn and the procedure terminated.  The patient tolerated the procedure well.      CT Abdomen Pelvis With & Without Contrast  Result Date: 4/26/2022  No evidence of acute intra-abdominal process.  7614.06 mGy.cm   This study was performed with techniques to keep radiation doses as low as reasonably achievable (ALARA). Individualized dose reduction techniques using automated exposure control or adjustment of mA and/or kV according to the patient size were employed.  This report was signed and finalized on 4/26/2022 8:16 AM by William Liu M.D..    Plan  I recommended using the Estrace that has been prescribed by her gynecologist 3 times per week.  She is not currently using it.  Follow-up with us as needed

## 2022-05-23 DIAGNOSIS — G47.00 INSOMNIA, UNSPECIFIED TYPE: ICD-10-CM

## 2022-05-23 NOTE — TELEPHONE ENCOUNTER
Patient is needing refill on her Temazepam sent to [facility] to last her until her apt on 6/1.

## 2022-05-26 RX ORDER — TEMAZEPAM 30 MG/1
30 CAPSULE ORAL NIGHTLY PRN
Qty: 30 CAPSULE | Refills: 0 | Status: SHIPPED | OUTPATIENT
Start: 2022-05-26 | End: 2022-06-01

## 2022-06-01 ENCOUNTER — OFFICE VISIT (OUTPATIENT)
Dept: PULMONOLOGY | Facility: CLINIC | Age: 47
End: 2022-06-01

## 2022-06-01 VITALS
HEIGHT: 64 IN | HEART RATE: 66 BPM | DIASTOLIC BLOOD PRESSURE: 80 MMHG | BODY MASS INDEX: 39.95 KG/M2 | SYSTOLIC BLOOD PRESSURE: 144 MMHG | OXYGEN SATURATION: 98 % | WEIGHT: 234 LBS | RESPIRATION RATE: 18 BRPM

## 2022-06-01 DIAGNOSIS — E66.01 MORBID OBESITY, UNSPECIFIED OBESITY TYPE: ICD-10-CM

## 2022-06-01 DIAGNOSIS — Z72.821 POOR SLEEP HYGIENE: ICD-10-CM

## 2022-06-01 DIAGNOSIS — G47.33 OSA (OBSTRUCTIVE SLEEP APNEA): Primary | ICD-10-CM

## 2022-06-01 DIAGNOSIS — G47.00 INSOMNIA, UNSPECIFIED TYPE: ICD-10-CM

## 2022-06-01 PROCEDURE — 99213 OFFICE O/P EST LOW 20 MIN: CPT | Performed by: NURSE PRACTITIONER

## 2022-06-01 RX ORDER — LISINOPRIL 40 MG/1
40 TABLET ORAL DAILY
COMMUNITY
Start: 2022-05-19

## 2022-06-01 RX ORDER — ZALEPLON 10 MG/1
10 CAPSULE ORAL NIGHTLY
Qty: 30 CAPSULE | Refills: 2 | Status: SHIPPED | OUTPATIENT
Start: 2022-06-01 | End: 2022-09-28

## 2022-06-01 RX ORDER — LEVOTHYROXINE SODIUM 100 UG/1
CAPSULE ORAL
COMMUNITY

## 2022-06-01 RX ORDER — NEBIVOLOL 10 MG/1
10 TABLET ORAL DAILY
COMMUNITY

## 2022-06-01 NOTE — PROGRESS NOTES
"Chief Complaint   Patient presents with   • Follow-up   • Sleeping Problem         Subjective   Debi Munoz is a 47 y.o. female.     History of Present Illness   Patient comes back today for follow up of Obstructive Sleep apnea.     Patient says that she is compliant with her device and using it regularly.    Patient's symptoms of sleep disturbance and daytime sleepiness have been helped greatly with the use of PAP device, as prescribed. She has been sick and not using machine as much.     She is trying to get back in the habit of using the machine.     She is going to bed earlier around 11:30 pm. She gets out of the bed at varying times but usually by 9 am. Her animals wake her b/t 7-8 am to be fed but she usually goes back to sleep.     She has not noticed that the restoril helps. She ran out of it for a few days and did not feel that she slept any different.     She has tried the ativan in the evening before bed. It does make her sleepy and makes her feel more relaxed but her mind still races.     Some nights she sleeps all night and other nights she wakes several times a night.     She feels her anxiety is why she cannot sleep. She states she worries about everything and cannot shut her mind off. She is not sure why occasionally she has no issues sleeping.     She is disabled and does not work.     She has medication for anxiety and takes it as needed but states this is not often.     The following portions of the patient's history were reviewed and updated as appropriate: allergies, current medications, past family history, past medical history, past social history and past surgical history.    Review of Systems   HENT: Positive for sinus pressure.    Respiratory: Positive for shortness of breath.    Psychiatric/Behavioral: Positive for sleep disturbance.       Objective   Visit Vitals  /80   Pulse 66   Resp 18   Ht 162.6 cm (64.02\")   Wt 106 kg (234 lb)   LMP 01/12/2021   SpO2 98%   BMI 40.14 kg/m² "         Physical Exam  Vitals reviewed.   Constitutional:       Appearance: She is well-developed.   HENT:      Head: Atraumatic.      Mouth/Throat:      Mouth: Mucous membranes are moist.   Eyes:      Extraocular Movements: Extraocular movements intact.   Abdominal:      Comments: Obese abdomen.    Musculoskeletal:      Comments: Gait normal.   Skin:     General: Skin is warm.   Neurological:      Mental Status: She is alert and oriented to person, place, and time.             Assessment & Plan   Diagnoses and all orders for this visit:    1. ERNA (obstructive sleep apnea) (Primary)    2. Insomnia, unspecified type  -     zaleplon (Sonata) 10 MG capsule; Take 1 capsule by mouth Every Night.  Dispense: 30 capsule; Refill: 2    3. Morbid obesity, unspecified obesity type (HCC)    4. Poor sleep hygiene           Return for keep appt for September.    DISCUSSION (if any):  The lorazepam was last dispensed in August 2021.     Since she has not noticed an improvement with Restoril, I have advised to discontinue the medication.     Rozerm was not approved by her insurance.    Ambien helped but she experienced parasomnia and sleep related behaviors which she did not remember such as eating  while taking the medication.     Sonata appears to be covered. I have discussed the possible SE of the medication.     Take melatonin at 8 pm and Sonata at 9 pm. Her bedtime goal is 11 pm.     PDMP reviewed.     A total of 21 minutes was spent reviewing all the information available including reviewing previous history from PCP/sleep specialist, as applicable, reviewing available sleep studies/compliance data.  This also included discussion regarding importance of sleep hygiene and possible lifestyle modifications, if applicable/necessary, that may impact sleep. Time was also spent documenting information in electronic health record and placing orders, as appropriate and applicable.      Dictated utilizing Dragon dictation.    This  document was electronically signed by JERO Alberto Gris 10, 2022  12:07 EDT

## 2022-08-26 ENCOUNTER — TRANSCRIBE ORDERS (OUTPATIENT)
Dept: ADMINISTRATIVE | Facility: HOSPITAL | Age: 47
End: 2022-08-26

## 2022-08-26 DIAGNOSIS — E66.01 MORBID OBESITY: ICD-10-CM

## 2022-08-26 DIAGNOSIS — E11.9 DIABETES MELLITUS WITHOUT COMPLICATION: Primary | ICD-10-CM

## 2022-09-28 ENCOUNTER — OFFICE VISIT (OUTPATIENT)
Dept: PULMONOLOGY | Facility: CLINIC | Age: 47
End: 2022-09-28

## 2022-09-28 VITALS
DIASTOLIC BLOOD PRESSURE: 85 MMHG | HEART RATE: 82 BPM | SYSTOLIC BLOOD PRESSURE: 126 MMHG | OXYGEN SATURATION: 99 % | HEIGHT: 64 IN | RESPIRATION RATE: 16 BRPM | WEIGHT: 226 LBS | BODY MASS INDEX: 38.58 KG/M2

## 2022-09-28 DIAGNOSIS — G47.33 OSA (OBSTRUCTIVE SLEEP APNEA): ICD-10-CM

## 2022-09-28 DIAGNOSIS — Z72.821 POOR SLEEP HYGIENE: ICD-10-CM

## 2022-09-28 DIAGNOSIS — E66.9 OBESITY (BMI 30-39.9): ICD-10-CM

## 2022-09-28 DIAGNOSIS — R06.02 SHORTNESS OF BREATH: Primary | ICD-10-CM

## 2022-09-28 DIAGNOSIS — R06.02 SOB (SHORTNESS OF BREATH): Primary | ICD-10-CM

## 2022-09-28 DIAGNOSIS — J45.20 MILD INTERMITTENT ASTHMA WITHOUT COMPLICATION: ICD-10-CM

## 2022-09-28 PROCEDURE — 99214 OFFICE O/P EST MOD 30 MIN: CPT | Performed by: NURSE PRACTITIONER

## 2022-09-28 PROCEDURE — 94726 PLETHYSMOGRAPHY LUNG VOLUMES: CPT | Performed by: INTERNAL MEDICINE

## 2022-09-28 PROCEDURE — 94729 DIFFUSING CAPACITY: CPT | Performed by: INTERNAL MEDICINE

## 2022-09-28 PROCEDURE — 95012 NITRIC OXIDE EXP GAS DETER: CPT | Performed by: NURSE PRACTITIONER

## 2022-09-28 PROCEDURE — 94060 EVALUATION OF WHEEZING: CPT | Performed by: INTERNAL MEDICINE

## 2022-09-28 RX ORDER — LEVOTHYROXINE SODIUM 88 UG/1
TABLET ORAL
COMMUNITY

## 2022-09-28 RX ORDER — ATORVASTATIN CALCIUM 10 MG/1
10 TABLET, FILM COATED ORAL DAILY
COMMUNITY
Start: 2022-09-12

## 2022-09-28 NOTE — PROGRESS NOTES
"Chief Complaint   Patient presents with   • Sleeping Problem   • Follow-up         Subjective   Debi Munoz is a 47 y.o. female.     History of Present Illness   Patient comes back today for follow up of Obstructive Sleep apnea.     Patient says that she is compliant with her device and using it regularly.    Patient's symptoms of sleep disturbance and daytime sleepiness have been helped greatly with the use of PAP device, as prescribed. She has no issue using the machine.     She took Sonata for 2 weeks and did not notice an improvement in falling asleep so she stopped taking it.     She still has difficulty falling asleep at times. She tries to be in bed by midnight, but her bedtime varies.    She c/o SOB with any activity. She has to mow grass in intervals due to needing to rest. She is very SOB after climbing just 1 flight of stairs. She c/o SOB over the last 5 years. She feels it has worsened due to her decreased ability to do the same activities as 3-5 years ago. She feels palpitations when she is SOB.     She does not notice a change or worsening of shortness of breath with season or weather changes.    She states she has lost some weight in the last few years.    She denies cough or nasal drainage.     She has fibromyalgia and c/o getting tired very easy and increased joint pain with basic activity.    The following portions of the patient's history were reviewed and updated as appropriate: allergies, current medications, past family history, past medical history, past social history and past surgical history.    Review of Systems   HENT: Negative for sinus pressure.    Respiratory: Positive for shortness of breath.    Cardiovascular: Negative for palpitations.   Psychiatric/Behavioral: Positive for sleep disturbance.       Objective   Visit Vitals  /85   Pulse 82   Resp 16   Ht 162.6 cm (64\")   Wt 103 kg (226 lb)   LMP 01/12/2021   SpO2 99%   BMI 38.79 kg/m²         Physical Exam  Vitals " reviewed.   Constitutional:       Appearance: She is well-developed.   HENT:      Head: Atraumatic.      Mouth/Throat:      Mouth: Mucous membranes are moist.      Comments: Crowded oropharynx.  Eyes:      Extraocular Movements: Extraocular movements intact.   Pulmonary:      Comments: Somewhat decreased air entry without wheezing  Abdominal:      Comments: Obese abdomen.   Musculoskeletal:      Comments: Gait was normal.   Skin:     General: Skin is warm.   Neurological:      Mental Status: She is alert and oriented to person, place, and time.           Assessment & Plan   Diagnoses and all orders for this visit:    1. SOB (shortness of breath) (Primary)  -     Pulmonary Function Test    2. ERNA (obstructive sleep apnea)    3. Poor sleep hygiene    4. Mild intermittent asthma without complication  -     Nitric Oxide  -     Peak Flow    5. Obesity (BMI 30-39.9)    Other orders  -     Fluticasone Furoate-Vilanterol (Breo Ellipta) 100-25 MCG/INH inhaler; Inhale 1 puff Daily. Rinse mouth with water after use.  Dispense: 1 each; Refill: 5           Return in about 4 months (around 1/28/2023) for Recheck, For Dr. Smith, pul appt.    DISCUSSION (if any):  Continue treatment with PAP therapy at the current pressure.     Unfortunately I do not have a compliance today to review/discussed with the patient.  I will asked the office staff to request a compliance from the DME company.    Humidification setup, hose and mask care discussed.    Weight loss advised.    Use every night for at least 4 hours stressed.    FeNO today 23 ppb.    Peak flow 240 L/min.    I reviewed PFT results and discussed results with the patient today.  PFT shows no obstruction.  No restriction with air trapping suggested.  Preserved diffusion capacity.    Based on her symptoms and testing she seems to have mild asthma.  I have prescribed Breo 100 daily.  I explained that she will need to rinse her mouth after using the inhaler.    I have discussed that  her shortness of breath is likely multifactorial including asthma, deconditioning, and obesity.  She verbalizes understanding.      Dictated utilizing Dragon dictation.    This document was electronically signed by JERO Alberto September 28, 2022  16:13 EDT

## 2022-12-30 ENCOUNTER — HOSPITAL ENCOUNTER (OUTPATIENT)
Dept: NUTRITION | Facility: HOSPITAL | Age: 47
Discharge: HOME | End: 2022-12-30
Admitting: FAMILY MEDICINE

## 2022-12-30 PROCEDURE — 97802 MEDICAL NUTRITION INDIV IN: CPT

## 2022-12-30 NOTE — PROGRESS NOTES
Adult Outpatient Nutrition  Assessment    Patient Name:  Debi Munoz  YOB: 1975  MRN: [id number]    Assessment Date:  12/30/2022    Comments:    Met w/ pt in-person for initial nutrition assessment regarding diabetes and morbid obesity. Pt reports she also has fatty liver as well. She has lost 25# w/in the last year and would like to ideally get down to 150# (desirable). She typically follows a low carbohydrate diet and reports her A1C is around 6%. She does not check her blood sugars regularly and when she does they normally run @ 150mg/dL a couple hours after a meal. Pt eats out at a sit down restaurant 4x/week w/ . She tries to prioritize vegetable intake when eating out.     I recommended that pt not follow a low carbohydrate diet and consume between 150-200mg/dL of carbs/day. Also, recommended she eats a diet higher in protein to help w/ fatty liver and control blood sugar levels. Provided pt w/ handouts regarding myplate method, healthy eating for diabetes and liver disease, also provided pt w/ calorie and protein ranges.     1. Consume 2040kcals and 89g protein per day.   2. Follow Myplate method at meal times.     Pt agreeable and notes goals are feasible. Pt denies any nutrition questions/concerns at this time. Pt w/ RD contact information and is encouraged to reach out at any time. Follow-up not scheduled at this time. RD available PRN.      General Info     Row Name 12/30/22 1546       Today's Session    Person(s) attending today's session Patient     Services Used Today? No       General Information    Preferred Language English    People in Home spouse               Physical Findings     Row Name 12/30/22 1546          Physical Findings    Overall Physical Appearance obese                Retired Nutritional Info/Activity     Row Name 12/30/22 1548          Eating Environment    Eating environment Family        Physical Activity    Are you currently involved in an  activity/exercise program?  No     Reasons for Inactivity Limited activity     How would you rank exercise as an important health lifestyle practice? 4                Home Nutrition Report     Row Name 12/30/22 1547          Home Nutrition Report    Typical Intake (Food/Fluid/EN/PN) 2 meals and 1-2 snacks     Food Preferences eats out 4x/week                Estimated/Assessed Needs - Anthropometrics     Row Name 12/30/22 1547          Anthropometrics    Age for Calculations 47     Weight for Calculation 75 kg (165 lb 5.5 oz)  adjusted bw        Estimated/Assessed Needs    Additional Documentation KCAL/KG (Group);Protein Requirements (Group);Fluid Requirements (Group);Estimated Calorie Needs (Group);Temple-St. Jeor Equation (Group)        Estimated Calorie Needs    Estimated Calorie Requirement (kcal/day) 2040        KCAL/KG    KCAL/KG 20 Kcal/Kg (kcal);30 Kcal/Kg (kcal);25 Kcal/Kg (kcal)     20 Kcal/Kg (kcal) 1500     25 Kcal/Kg (kcal) 1875     30 Kcal/Kg (kcal) 2250        Protein Requirements    Weight Used For Protein Calculations 74.8 kg (165 lb)  adjusted bw     Est Protein Requirement Amount (gms/kg) 1.2 gm protein     Estimated Protein Requirements (gms/day) 89.81        Fluid Requirements    Fluid Requirements (mL/day) 2040  1mL/kcal     RDA Method (mL) 2040                     Electronically signed by:  Walter Wilson RD  12/30/22 15:50 EST

## 2023-02-01 ENCOUNTER — OFFICE VISIT (OUTPATIENT)
Dept: PULMONOLOGY | Facility: CLINIC | Age: 48
End: 2023-02-01
Payer: COMMERCIAL

## 2023-02-01 VITALS
BODY MASS INDEX: 40.97 KG/M2 | RESPIRATION RATE: 18 BRPM | HEART RATE: 74 BPM | DIASTOLIC BLOOD PRESSURE: 74 MMHG | OXYGEN SATURATION: 96 % | WEIGHT: 240 LBS | SYSTOLIC BLOOD PRESSURE: 128 MMHG | HEIGHT: 64 IN

## 2023-02-01 DIAGNOSIS — G47.33 OSA (OBSTRUCTIVE SLEEP APNEA): Primary | ICD-10-CM

## 2023-02-01 DIAGNOSIS — G47.00 INSOMNIA, UNSPECIFIED TYPE: ICD-10-CM

## 2023-02-01 DIAGNOSIS — J45.40 MODERATE PERSISTENT ASTHMA WITHOUT COMPLICATION: ICD-10-CM

## 2023-02-01 DIAGNOSIS — Z72.821 POOR SLEEP HYGIENE: ICD-10-CM

## 2023-02-01 PROCEDURE — 99214 OFFICE O/P EST MOD 30 MIN: CPT | Performed by: INTERNAL MEDICINE

## 2023-02-01 RX ORDER — SUVOREXANT 10 MG/1
1 TABLET, FILM COATED ORAL NIGHTLY
Qty: 30 TABLET | Refills: 2 | Status: SHIPPED | OUTPATIENT
Start: 2023-02-01

## 2023-02-01 RX ORDER — ALBUTEROL SULFATE 90 UG/1
2 AEROSOL, METERED RESPIRATORY (INHALATION) EVERY 4 HOURS PRN
Qty: 18 G | Refills: 5 | Status: SHIPPED | OUTPATIENT
Start: 2023-02-01

## 2023-02-01 NOTE — PROGRESS NOTES
"  Chief Complaint   Patient presents with   • Follow-up   • Sleeping Problem       Subjective   Debi Munoz is a 47 y.o. female.     History of Present Illness   Patient was evaluated today for follow up of Sleep apnea and shortness of breath.     Patient doesn't report any issues with the PAP device. The patient describes no significant issues with her mask either.     Patient says that the compliance with the use of the equipment is somewhat poor.     Patient is still complaining of symptoms of insomnia.  Patient says that she has noticed issues going to & maintaining sleep. she goes to bed around 11 PM and leaves the bed in the morning around 5 AM.  The patient says that she feels that she only sleeps for 3-4 hours per night.    she has tried Trazodone, Restoril and Ambien (or similar) without any success.    Patient does not report any recent exacerbations requiring emergency room visits or hospitalizations.    Patient is compliant with pulmonary medicines, as prescribed.     she is currently on Breo. she does not have a rescue inhaler at this time.     The following portions of the patient's history were reviewed and updated as appropriate: allergies, current medications, past family history, past medical history, past social history and past surgical history.    Review of Systems   HENT: Negative for sinus pressure, sneezing and sore throat.    Respiratory: Negative for cough, chest tightness, shortness of breath and wheezing.        Objective   Visit Vitals  /74   Pulse 74   Resp 18   Ht 162.6 cm (64.02\")   Wt 109 kg (240 lb)   LMP 01/12/2021   SpO2 96%   BMI 41.18 kg/m²       Physical Exam  Vitals reviewed.   Constitutional:       Appearance: She is well-developed.   HENT:      Head: Atraumatic.      Mouth/Throat:      Comments: Oropharynx was crowded.  Neck:      Comments: Increased adipose tissue noted.  Cardiovascular:      Rate and Rhythm: Normal rate and regular rhythm.      Heart sounds: " Normal heart sounds. No murmur heard.  Pulmonary:      Effort: Pulmonary effort is normal. No respiratory distress.      Breath sounds: Normal breath sounds. No wheezing or rales.   Musculoskeletal:      Comments: Gait was normal.   Neurological:      Mental Status: She is alert and oriented to person, place, and time.           Assessment & Plan   Diagnoses and all orders for this visit:    1. ERNA (obstructive sleep apnea) (Primary)    2. Moderate persistent asthma without complication    3. Insomnia, unspecified type  -     Suvorexant (Belsomra) 10 MG tablet; Take 1 tablet by mouth Every Night.  Dispense: 30 tablet; Refill: 2    4. Poor sleep hygiene    Other orders  -     albuterol sulfate HFA (Ventolin HFA) 108 (90 Base) MCG/ACT inhaler; Inhale 2 puffs Every 4 (Four) Hours As Needed for Wheezing or Shortness of Air.  Dispense: 18 g; Refill: 5           Return in about 4 months (around 5/29/2023) for Sleep[facility], [facility], ....Also 7 mths w/ Dr. Smith.    DISCUSSION (if any):  I reviewed the results of last sleep study in detail. It was performed in Jan 2017. I informed her that the apnea hypopnea index was 24 / hr.     I told the patient that her symptoms are consistent with poorly controlled sleep apnea.    Patient was advised to continue using PAP for at least 4 hours every night.    If she continues to have issues, then we may consider MSLT.     It appears that her symptoms of asthma are under adequate control with the current regimen.    Patient's medications for underlying asthma were reviewed in great detail.    Compliance with medications stressed.     Side effects of prescribed medications discussed with the patient.    The need to continue to be aware of triggers that may cause asthma exacerbation versus progression of disease, was also discussed.    I have discussed asthma action plan with her.    The patient was asked to call this office if the symptoms worsen.    Takes naps every day and see if  that helps.     Sleep hygiene measures were discussed with the patient in great detail.    The patient was told that she will need to follow a strict time to go to bed as well as a fixed time to get out of bed.    Multiple measures were discussed including sleep restriction and she was strongly encouraged to follow other recommendations including avoiding naps, watching TV in the bed etc.    Due to significant ongoing symptoms, we will start the patient on Belsomra.     She has tried and failed to respond to multiple agents, including Trazodone, Temezepam, Ambien, Lunesta and others.     Side effects of prescribed medication were discussed.    Arturo was reviewed.        Dictated utilizing Dragon dictation.    This document was electronically signed by Afshan Smith MD on 02/01/23 at 10:23 EST

## 2023-06-15 ENCOUNTER — OFFICE VISIT (OUTPATIENT)
Dept: PULMONOLOGY | Facility: CLINIC | Age: 48
End: 2023-06-15
Payer: COMMERCIAL

## 2023-06-15 VITALS
HEART RATE: 77 BPM | BODY MASS INDEX: 40.4 KG/M2 | HEIGHT: 63 IN | DIASTOLIC BLOOD PRESSURE: 74 MMHG | OXYGEN SATURATION: 93 % | WEIGHT: 228 LBS | RESPIRATION RATE: 14 BRPM | SYSTOLIC BLOOD PRESSURE: 116 MMHG

## 2023-06-15 DIAGNOSIS — E66.01 MORBID OBESITY, UNSPECIFIED OBESITY TYPE: ICD-10-CM

## 2023-06-15 DIAGNOSIS — G47.00 INSOMNIA, UNSPECIFIED TYPE: ICD-10-CM

## 2023-06-15 DIAGNOSIS — J45.40 MODERATE PERSISTENT ASTHMA WITHOUT COMPLICATION: ICD-10-CM

## 2023-06-15 DIAGNOSIS — G47.33 OSA (OBSTRUCTIVE SLEEP APNEA): Primary | ICD-10-CM

## 2023-06-15 RX ORDER — LURASIDONE HYDROCHLORIDE 20 MG/1
20 TABLET, FILM COATED ORAL
COMMUNITY
Start: 2023-04-24 | End: 2023-06-15

## 2023-06-15 RX ORDER — DESVENLAFAXINE SUCCINATE 50 MG/1
50 TABLET, EXTENDED RELEASE ORAL DAILY
COMMUNITY
Start: 2023-05-22

## 2023-06-15 RX ORDER — DOXEPIN HYDROCHLORIDE 25 MG/1
25 CAPSULE ORAL NIGHTLY
Qty: 30 CAPSULE | Refills: 3 | Status: SHIPPED | OUTPATIENT
Start: 2023-06-15

## 2023-06-15 RX ORDER — DULAGLUTIDE 1.5 MG/.5ML
INJECTION, SOLUTION SUBCUTANEOUS
COMMUNITY
Start: 2023-06-01

## 2023-06-15 RX ORDER — FLUTICASONE FUROATE AND VILANTEROL 100; 25 UG/1; UG/1
1 POWDER RESPIRATORY (INHALATION) DAILY
Qty: 1 EACH | Refills: 5 | Status: SHIPPED | OUTPATIENT
Start: 2023-06-15 | End: 2023-06-16 | Stop reason: ALTCHOICE

## 2023-06-15 NOTE — PROGRESS NOTES
"Chief Complaint   Patient presents with    Sleeping Problem    Follow-up         Subjective   Debi Munoz is a 48 y.o. female.   Patient comes back today for follow up of Obstructive Sleep apnea and SOB.    Patient says that she is compliant with her device and using it regularly.    Patient's symptoms of sleep disturbance and daytime sleepiness have been helped greatly with the use of PAP device, as prescribed.  However, she states she does not use it as much as she should use it.     She says there is no consistency in her sleep. She feels maybe she has been sleeping a little better. Some nights she sleeps good and some nights she does not.     She has been trying to go to bed at 11 pm and get up 8 am but the get out of bed time varies. She does not work so she does not have to get up to be anywhere.     She states she has been having more trouble falling asleep lately. She is changing her depression medications around.     Her asthma symptoms have been stable. She uses Breo daily. She uses WARNER intermittently if mowing.       The following portions of the patient's history were reviewed and updated as appropriate: allergies, current medications, past family history, past medical history, past social history, and past surgical history.      Review of Systems   HENT:  Negative for sinus pressure, sneezing and sore throat.    Respiratory:  Negative for cough, chest tightness, shortness of breath and wheezing.        Objective   Visit Vitals  /74   Pulse 77   Resp 14   Ht 160 cm (63\") Comment: pt reported   Wt 103 kg (228 lb)   LMP 01/12/2021   SpO2 93%   BMI 40.39 kg/m²         Physical Exam  Vitals reviewed.   Constitutional:       Appearance: She is well-developed.   HENT:      Head: Atraumatic.      Mouth/Throat:      Mouth: Mucous membranes are moist.   Eyes:      Extraocular Movements: Extraocular movements intact.   Cardiovascular:      Rate and Rhythm: Normal rate and regular rhythm.   Pulmonary: "      Effort: Pulmonary effort is normal. No respiratory distress.      Breath sounds: No wheezing.   Abdominal:      Comments: Obese abdomen.   Musculoskeletal:      Comments: Gait normal.   Skin:     General: Skin is warm.   Neurological:      Mental Status: She is alert and oriented to person, place, and time.         Assessment & Plan   Diagnoses and all orders for this visit:    1. ERNA (obstructive sleep apnea) (Primary)    2. Insomnia, unspecified type    3. Morbid obesity, unspecified obesity type    4. Moderate persistent asthma without complication    Other orders  -     Fluticasone Furoate-Vilanterol 100-25 MCG/ACT aerosol powder ; Inhale 1 puff Daily. Rinse mouth with water after use.  Dispense: 1 each; Refill: 5  -     doxepin (SINEquan) 25 MG capsule; Take 1 capsule by mouth Every Night.  Dispense: 30 capsule; Refill: 3           Return for keep appt in September.    DISCUSSION (if any):  We discussed the importance of good sleep hygiene and having a fixed bedtime and wake time.  She has interrupted sleep during the night some nights as well.    I have asked her to resume CPAP therapy on a nightly basis.    I will asked the staff to obtain a compliance report from the DME company.    Unfortunately, Belsomra was denied by insurance.     She has tried and failed to respond to multiple agents, including Trazodone, Temezepam, Ambien, Lunesta and others.      She is taking pristiq in the morning. I have discussed serotonin syndrome symptoms with the patient. I have suggested doxepin nightly about 1 hour prior to bedtime.  She is no longer taking Latuda at bedtime but has not discussed this with her mental health provider yet.    If her mental health provider feels that she needs to add another antidepressant she will need to stop doxepin.    She f/w Adry Ramos, her mental health provider next week. I have told her to discuss the addition of doxepin at bedtime for insomnia.     Her asthma is stable at  this time with Nildao and she should continue using this daily.    Patient's medications for underlying asthma were reviewed with her in great detail.    Any needed adjustments to her pulmonary medications, either for clinical or insurance coverage reasons, have been made and are reflected in the orders.    Side effects of prescribed medications discussed with the patient.    Asthma action plan with discussed with her.    The patient was asked to call this office if the symptoms worsen.     Dictated utilizing Dragon dictation.    This document was electronically signed by JERO Alberto Gris 15, 2023  16:10 EDT

## 2023-06-15 NOTE — LETTER
Gris 15, 2023     JERO Anand  [address]  [place] [address]  [facility]ly [facility] [address]    Patient: Debi Munoz   YOB: 1975   Date of Visit: 6/15/2023     Dear JERO Riddle:    Thank you for referring Debi Munoz to me for evaluation. Below are the relevant portions of my assessment and plan of care.    If you have questions, please do not hesitate to call me. I look forward to following Debi along with you.         Sincerely,        JERO Tamayo        CC: No Recipients      Progress Notes:  Chief Complaint   Patient presents with   • Sleeping Problem   • Follow-up         Subjective  Debi Munoz is a 48 y.o. female.   Patient comes back today for follow up of Obstructive Sleep apnea and SOB.    Patient says that she is compliant with her device and using it regularly.    Patient's symptoms of sleep disturbance and daytime sleepiness have been helped greatly with the use of PAP device, as prescribed.  However, she states she does not use it as much as she should use it.     She says there is no consistency in her sleep. She feels maybe she has been sleeping a little better. Some nights she sleeps good and some nights she does not.     She has been trying to go to bed at 11 pm and get up 8 am but the get out of bed time varies. She does not work so she does not have to get up to be anywhere.     She states she has been having more trouble falling asleep lately. She is changing her depression medications around.     Her asthma symptoms have been stable. She uses Breo daily. She uses WARNER intermittently if mowing.       The following portions of the patient's history were reviewed and updated as appropriate: allergies, current medications, past family history, past medical history, past social history, and past surgical history.      Review of Systems   HENT:  Negative for sinus pressure, sneezing and sore throat.    Respiratory:  Negative for cough, chest  "tightness, shortness of breath and wheezing.        Objective  Visit Vitals  /74   Pulse 77   Resp 14   Ht 160 cm (63\") Comment: pt reported   Wt 103 kg (228 lb)   LMP 01/12/2021   SpO2 93%   BMI 40.39 kg/m²         Physical Exam  Vitals reviewed.   Constitutional:       Appearance: She is well-developed.   HENT:      Head: Atraumatic.      Mouth/Throat:      Mouth: Mucous membranes are moist.   Eyes:      Extraocular Movements: Extraocular movements intact.   Cardiovascular:      Rate and Rhythm: Normal rate and regular rhythm.   Pulmonary:      Effort: Pulmonary effort is normal. No respiratory distress.      Breath sounds: No wheezing.   Abdominal:      Comments: Obese abdomen.   Musculoskeletal:      Comments: Gait normal.   Skin:     General: Skin is warm.   Neurological:      Mental Status: She is alert and oriented to person, place, and time.         Assessment & Plan  Diagnoses and all orders for this visit:    1. ERNA (obstructive sleep apnea) (Primary)    2. Insomnia, unspecified type    3. Morbid obesity, unspecified obesity type    4. Moderate persistent asthma without complication    Other orders  -     Fluticasone Furoate-Vilanterol 100-25 MCG/ACT aerosol powder ; Inhale 1 puff Daily. Rinse mouth with water after use.  Dispense: 1 each; Refill: 5  -     doxepin (SINEquan) 25 MG capsule; Take 1 capsule by mouth Every Night.  Dispense: 30 capsule; Refill: 3           Return for keep appt in September.    DISCUSSION (if any):  We discussed the importance of good sleep hygiene and having a fixed bedtime and wake time.  She has interrupted sleep during the night some nights as well.    I have asked her to resume CPAP therapy on a nightly basis.    I will asked the staff to obtain a compliance report from the DME company.    Unfortunately, Belsomra was denied by insurance.     She has tried and failed to respond to multiple agents, including Trazodone, Temezepam, Ambien, Lunesta and others.      She is " taking pristiq in the morning. I have discussed serotonin syndrome symptoms with the patient. I have suggested doxepin nightly about 1 hour prior to bedtime.  She is no longer taking Latuda at bedtime but has not discussed this with her mental health provider yet.    If her mental health provider feels that she needs to add another antidepressant she will need to stop doxepin.    She f/w Adry Ramos, her mental health provider next week. I have told her to discuss the addition of doxepin at bedtime for insomnia.     Her asthma is stable at this time with Jama and she should continue using this daily.    Patient's medications for underlying asthma were reviewed with her in great detail.    Any needed adjustments to her pulmonary medications, either for clinical or insurance coverage reasons, have been made and are reflected in the orders.    Side effects of prescribed medications discussed with the patient.    Asthma action plan with discussed with her.    The patient was asked to call this office if the symptoms worsen.     Dictated utilizing Dragon dictation.    This document was electronically signed by JERO Alberto Gris 15, 2023  16:10 EDT

## 2023-06-16 DIAGNOSIS — J45.40 MODERATE PERSISTENT ASTHMA WITHOUT COMPLICATION: Primary | ICD-10-CM

## 2023-06-16 RX ORDER — FLUTICASONE FUROATE AND VILANTEROL 100; 25 UG/1; UG/1
1 POWDER RESPIRATORY (INHALATION)
Qty: 60 EACH | Refills: 3 | Status: SHIPPED | OUTPATIENT
Start: 2023-06-16

## 2023-09-05 ENCOUNTER — OFFICE VISIT (OUTPATIENT)
Dept: PULMONOLOGY | Facility: CLINIC | Age: 48
End: 2023-09-05
Payer: COMMERCIAL

## 2023-09-05 VITALS
RESPIRATION RATE: 18 BRPM | HEART RATE: 92 BPM | WEIGHT: 223.2 LBS | DIASTOLIC BLOOD PRESSURE: 73 MMHG | SYSTOLIC BLOOD PRESSURE: 124 MMHG | OXYGEN SATURATION: 98 % | BODY MASS INDEX: 39.55 KG/M2 | HEIGHT: 63 IN

## 2023-09-05 DIAGNOSIS — J45.40 MODERATE PERSISTENT ASTHMA WITHOUT COMPLICATION: ICD-10-CM

## 2023-09-05 DIAGNOSIS — G47.33 OSA (OBSTRUCTIVE SLEEP APNEA): Primary | ICD-10-CM

## 2023-09-05 DIAGNOSIS — Z72.821 POOR SLEEP HYGIENE: ICD-10-CM

## 2023-09-05 DIAGNOSIS — E66.9 OBESITY (BMI 30-39.9): ICD-10-CM

## 2023-09-05 PROCEDURE — 99214 OFFICE O/P EST MOD 30 MIN: CPT | Performed by: INTERNAL MEDICINE

## 2023-09-05 NOTE — PROGRESS NOTES
"  Chief Complaint   Patient presents with    Sleeping Problem    Follow-up         Subjective   Debi Munoz is a 48 y.o. female.   Patient was evaluated today for follow up of asthma, ERNA and insomnia.     Patient does not report any recent exacerbations requiring emergency room visits or hospitalizations.     Patient is compliant with pulmonary medicines, as prescribed.     she is currently on Breo. she is using the rescue inhalers minimally.     Still gets annoyed by CPAP and uses it 3-4 times per night.     Tried Doxepin but felt that it was \"lasting too long\".    Goes to bed around 2 AM or so. Wakes up around 11 AM or so.    she feels that her antidepressants have been changed a few times recently and she is currently not on any medication for it.     The following portions of the patient's history were reviewed and updated as appropriate: allergies, current medications, past family history, past medical history, past social history, and past surgical history.    Review of Systems   HENT:  Negative for sinus pressure, sneezing and sore throat.    Respiratory:  Negative for cough, chest tightness, shortness of breath and wheezing.    Psychiatric/Behavioral:  Positive for sleep disturbance.      Objective   Visit Vitals  /73   Pulse 92   Resp 18   Ht 160 cm (63\") Comment: pt reported   Wt 101 kg (223 lb 3.2 oz)   LMP 01/12/2021   SpO2 98%   BMI 39.54 kg/m²       Physical Exam  Vitals reviewed.   Constitutional:       Appearance: She is well-developed.   HENT:      Head: Atraumatic.      Mouth/Throat:      Comments: Oropharynx was crowded.  Neck:      Comments: Increased adipose tissue noted.  Cardiovascular:      Rate and Rhythm: Normal rate and regular rhythm.      Heart sounds: Normal heart sounds. No murmur heard.  Pulmonary:      Effort: Pulmonary effort is normal. No respiratory distress.      Breath sounds: Normal breath sounds. No wheezing or rales.   Musculoskeletal:      Comments: Gait was " normal.   Neurological:      Mental Status: She is alert and oriented to person, place, and time.         Assessment & Plan   Diagnoses and all orders for this visit:    1. ERNA (obstructive sleep apnea) (Primary)    2. Moderate persistent asthma without complication    3. Obesity (BMI 30-39.9)    4. Poor sleep hygiene             Return in about 6 months (around 3/5/2024) for Recheck, For Mackenzie Mari), Jay, ....Also 14 mths w/ Dr. Smith.    DISCUSSION (if any):  It appears that her symptoms of asthma are under good control with the current regimen.    Patient's medications for underlying asthma were reviewed in great detail.    Compliance with medications stressed.     Side effects of prescribed medications discussed with the patient.    The need to continue to be aware of triggers that may cause asthma exacerbation versus progression of disease, was also discussed.    I have discussed asthma action plan with her.    The patient was asked to call this office if the symptoms worsen.    Sleep study performed in Jan 2017  AHI was 24 / hour.     Latest PAP device provided in Mid 2017  DME company: [employer]    Current PAP settings: 10  Current mask type: Nasal Mask    Will try a FFM, as some of her dryness in her throat could be due to her opening her mouth.     Compliance data will be obtained from the her device/DME company.    Continue PAP device on a regular basis, at least 4 hours or more per night.    Sleep hygiene measures were discussed    Weight loss advised.    Dictated utilizing Dragon dictation.    This document was electronically signed by Afshan Smith MD on 09/05/23 at 13:33 EDT

## 2023-10-02 NOTE — PLAN OF CARE
CHIEF COMPLAINT: Typical postoperative discomforts    Problem List:  Oncology/Hematology History Overview Note   1.  Colon cancer found in a tubular adenoma 20 cm from the anal verge with negative staging CT chest abdomen and pelvis normal baseline CEA 1.26 with no visible lesion on MRI rectal protocol.  9/25/2023 laparoscopic sigmoid colectomy Dr. Michel verbal pathology report from pathologist at  states 3 out of 13 nodes positive probably T3  2.  Goiter with 1.8 cm left thyroid nodule found on colon cancer staging for which ultrasound follow-up recommended  3.  Myomectomy with bladder tack July 2015  4.  Diabetes  5.  Hypertension  6.  Hyperlipidemia  7.  Migraines  8.  Depression  9.  Reflux  10.  History of uterine ablation  11.  History of tonsillectomy  12.  History of tympanostomy tubes  13.  History of L4-5 disc surgery and L5-S1 discectomy and fusion  14.  History of solid and liquid phase dysphagia with history of mild gastritis and some esophageal ranging on EGD 2019  15.  Compound melanocytic nevus with dermatofibroma removed by Dr. Ray 07/12/2023    Oncology history timeline:  -12/20/2019 EGD showed small hiatal hernia with some ringing of the esophagus biopsy.  Mild gastritis.  No intestinal metaplasia or dysplasia or malignancy.  Reflux suggestive but not diagnostic.  -8/18/2023 colonoscopy Dr. Calle.  Diverticulosis in the sigmoid colon.  7 mm polyp in the rectum.  Diverticulosis in the sigmoid colon.  Polypoid area with ulceration crescent-shaped 1.8 cm 20 cm proximal to the anus likely sigmoid.  Tattooed and biopsied.  Rectal polyp showed tubular adenoma with no high-grade dysplasia.  Colon biopsy at 20 cm showed invasive moderately differentiated colon adenocarcinoma arising within a tubular adenoma with high-grade dysplasia.  Microsatellite stable    -8/24/2023 CEA normal 1.26.  ALT 34 bicarb 20.2 otherwise unremarkable CMP.  CBC normal with hemoglobin 14.4 and normal MCV.  -8/27/2023 CT  Goal Outcome Evaluation:     Progress: improving  Outcome Summary: VSS, cont routine post op care   abdomen pelvis with contrast did not show any colonic mass.  Mild distal esophageal thickening, hepatic steatosis without hepatic metastasis.  No lytic bone disease.  MRI rectal protocol without contrast reviewed with Dr. López.  Perhaps a small pucker at the site of biopsy but no discernible mass per se and no adenopathy.  -8/28/2023 CT chest shows no metastasis.  1.8 cm left thyroid for which follow-up is recommended    -8/29/2023 [facility] medical oncology consult: On screening colonoscopy patient found to have ulcerated polyp at 20 cm from the anal verge with moderately differentiated adenocarcinoma microsatellite stable.  Normal baseline CEA with staging CT chest abdomen pelvis and MRI rectal protocol showing no evidence of metastasis and no visible primary on MRI.  Based on distance from anal verge on colonoscopy and given the absence of visible abnormality on MRI rectal protocol to change that estimated position, would call this colon cancer.  Given microsatellite stability, would not contemplate calling this rectal cancer and treating with immunotherapy but regardless of whether rectal or colon primary this would at least be no more than T1 tumor clinically with no visible tumor on MRI despite colonoscopy findings.  The patient has experience with Dr. Michel with whom she requests a consultation for definitive surgery and I will see back postoperatively to decide on any adjuvant therapy as indicated based on pathologic staging.  There is some esophageal thickening on CT for which I would request Dr. Calle repeat EGD last done 2019 just for completeness sake but first would deal with the colon cancer at hand.She will also need to get left thyroid ultrasound eventually and would likely do this through whoever has managed her goiter previously.      -- 9/25/2023 laparoscopic sigmoid colectomy Dr. Michel.    -10/2/2023 [facility] medical oncology follow-up: Typed final pathology from surgery done a week ago is not back.   She according to verbal report to me from the pathologist has either T2 or T3 disease but he thinks that this is tracking along the arteries and will be T3 and she had 3 out of 13 nodes involved.  Hence I would call this stage IIIb.  Her baseline CEA was normal preoperatively so that will not be extremely helpful postoperatively.  Outside of clinical trial the standard of care would be 3 months of CapeOx or 6 months of FOLFOX.  I would be interested in putting her on NRG- where upon CT DNA negative patients are randomized to either serial screening and CT DNA or 3 months CapeOx/6 months FOLFOX providers choice.  CT DNA positive patients are randomized to either 6 months of CapeOx or FOLFOX or 6 months of FOLFIRINOX.  If we can go ahead and get the [facility] CT DNA cooking outside of clinical trial without significant financial toxicity we may do that.  Ideally chemotherapy would start less than 8 weeks out from surgery and we should have plenty of time to have her heal and to make these decisions collectively.          Malignant neoplasm of sigmoid colon   8/29/2023 Initial Diagnosis    Malignant neoplasm of sigmoid colon     10/2/2023 Cancer Staged    Staging form: Colon And Rectum, AJCC 8th Edition  - Clinical: Stage IIIB (cT3, cN1, cM0) - Signed by Bernardino Chaparro MD on 10/2/2023         HISTORY OF PRESENT ILLNESS:  The patient is a 55 y.o. female, here for follow up on management of colon cancer with typical postoperative discomforts    Past Medical History:   Diagnosis Date    Anxiety     Arthritis     Cant recall the date of onset    Chest pain 11/06/2019    Colon polyp     Depression     Diabetes mellitus     Dysphagia 11/06/2019    Endometriosis     Fibromyalgia, primary     GERD (gastroesophageal reflux disease)     Headache     HL (hearing loss)     Hyperlipidemia     Hypertension     Low vitamin D level     Migraines     Ovarian cyst     PMDD (premenstrual dysphoric disorder)     Scoliosis     Wears  "glasses      Past Surgical History:   Procedure Laterality Date    BLADDER SURGERY      Bladder tuck    COLONOSCOPY      every 5 years    ENDOMETRIAL ABLATION      LUMBAR DISCECTOMY  2010    L4-S1 Rt- discectomy Dr. Arellano    SPINAL CORD STIMULATOR IMPLANT N/A 08/19/2019    Procedure: SPINAL CORD STIMULATOR INSERTION;  Surgeon: Ethan Peters MD;  Location: [facility];  Service: Neurosurgery    SPINE SURGERY  2010    TONSILLECTOMY AND ADENOIDECTOMY         Allergies   Allergen Reactions    Dihydroergotamine GI Intolerance     VOMITING    Methergine [Methylergonovine Maleate] Nausea And Vomiting    Amoxicillin Rash       Family History and Social History reviewed and changed as necessary    REVIEW OF SYSTEM:   Typical postoperative discomforts.  Bowels working.    PHYSICAL EXAM:  Puncture sites from laparoscopy healing well.    Vitals:    10/02/23 1315   BP: 123/69   Pulse: 76   Resp: 16   Temp: 97.9 °F (36.6 °C)   SpO2: 96%   Weight: 75.3 kg (166 lb)   Height: 162.6 cm (64\")     Vitals:    10/02/23 1315   PainSc: 0-No pain  Comment: No new pain. Still sore from sx          ECOG score: 0           Vitals reviewed.    ECOG: (0) Fully Active - Able to Carry On All Pre-disease Performance Without Restriction    Lab Results   Component Value Date    HGB 11.2 09/28/2023    HCT 34.0 09/28/2023    MCV 91 09/28/2023     09/28/2023    WBC 7.93 09/28/2023    NEUTROABS 8.11 (H) 09/25/2023    LYMPHSABS 2.26 09/25/2023    MONOSABS 0.64 09/25/2023    EOSABS 0.12 09/25/2023    BASOSABS 0.06 09/25/2023       Lab Results   Component Value Date    GLUCOSE 95 08/24/2023    BUN 13 08/24/2023    CREATININE 0.90 08/28/2023     08/24/2023    K 4.1 08/24/2023     08/24/2023    CO2 20.2 (L) 08/24/2023    CALCIUM 9.5 08/24/2023    PROTEINTOT 7.2 08/24/2023    ALBUMIN 4.6 08/24/2023    BILITOT 0.4 08/24/2023    ALKPHOS 82 08/24/2023    AST 13 08/24/2023    ALT 34 (H) 08/24/2023             ASSESSMENT & PLAN:  1.  Colon " cancer found in a tubular adenoma 20 cm from the anal verge with negative staging CT chest abdomen and pelvis normal baseline CEA 1.26 with no visible lesion on MRI rectal protocol.  9/25/2023 laparoscopic sigmoid colectomy Dr. Michel verbal pathology report from pathologist at  states 2 out of 13 nodes positive probably T3  2.  Goiter with 1.8 cm left thyroid nodule found on colon cancer staging for which ultrasound follow-up recommended  3.  Myomectomy with bladder tack July 2015  4.  Diabetes  5.  Hypertension  6.  Hyperlipidemia  7.  Migraines  8.  Depression  9.  Reflux  10.  History of uterine ablation  11.  History of tonsillectomy  12.  History of tympanostomy tubes  13.  History of L4-5 disc surgery and L5-S1 discectomy and fusion  14.  History of solid and liquid phase dysphagia with history of mild gastritis and some esophageal ranging on EGD 2019  15.  Compound melanocytic nevus with dermatofibroma removed by Dr. aRy 07/12/2023    Oncology history timeline:  -12/20/2019 EGD showed small hiatal hernia with some ringing of the esophagus biopsy.  Mild gastritis.  No intestinal metaplasia or dysplasia or malignancy.  Reflux suggestive but not diagnostic.  -8/18/2023 colonoscopy Dr. Calle.  Diverticulosis in the sigmoid colon.  7 mm polyp in the rectum.  Diverticulosis in the sigmoid colon.  Polypoid area with ulceration crescent-shaped 1.8 cm 20 cm proximal to the anus likely sigmoid.  Tattooed and biopsied.  Rectal polyp showed tubular adenoma with no high-grade dysplasia.  Colon biopsy at 20 cm showed invasive moderately differentiated colon adenocarcinoma arising within a tubular adenoma with high-grade dysplasia.  Microsatellite stable    -8/24/2023 CEA normal 1.26.  ALT 34 bicarb 20.2 otherwise unremarkable CMP.  CBC normal with hemoglobin 14.4 and normal MCV.  -8/27/2023 CT abdomen pelvis with contrast did not show any colonic mass.  Mild distal esophageal thickening, hepatic steatosis without  hepatic metastasis.  No lytic bone disease.  MRI rectal protocol without contrast reviewed with Dr. López.  Perhaps a small pucker at the site of biopsy but no discernible mass per se and no adenopathy.  -8/28/2023 CT chest shows no metastasis.  1.8 cm left thyroid for which follow-up is recommended    -8/29/2023 [facility] medical oncology consult: On screening colonoscopy patient found to have ulcerated polyp at 20 cm from the anal verge with moderately differentiated adenocarcinoma microsatellite stable.  Normal baseline CEA with staging CT chest abdomen pelvis and MRI rectal protocol showing no evidence of metastasis and no visible primary on MRI.  Based on distance from anal verge on colonoscopy and given the absence of visible abnormality on MRI rectal protocol to change that estimated position, would call this colon cancer.  Given microsatellite stability, would not contemplate calling this rectal cancer and treating with immunotherapy but regardless of whether rectal or colon primary this would at least be no more than T1 tumor clinically with no visible tumor on MRI despite colonoscopy findings.  The patient has experience with Dr. Michel with whom she requests a consultation for definitive surgery and I will see back postoperatively to decide on any adjuvant therapy as indicated based on pathologic staging.  There is some esophageal thickening on CT for which I would request Dr. Calle repeat EGD last done 2019 just for completeness sake but first would deal with the colon cancer at hand.She will also need to get left thyroid ultrasound eventually and would likely do this through whoever has managed her goiter previously.      -- 9/25/2023 laparoscopic sigmoid colectomy Dr. Michel.    -10/2/2023 [facility] medical oncology follow-up: Typed final pathology from surgery done a week ago is not back.  She according to verbal report to me from the pathologist has either T2 or T3 disease but he thinks that this is  tracking along the arteries and will be T3 and she had 3 out of 13 nodes involved.  Hence I would call this stage IIIb.  Her baseline CEA was normal preoperatively so that will not be extremely helpful postoperatively.  Outside of clinical trial the standard of care would be 3 months of CapeOx or 6 months of FOLFOX.  I would be interested in putting her on NRG- where upon CT DNA negative patients are randomized to either serial screening and CT DNA or 3 months CapeOx/6 months FOLFOX providers choice.  CT DNA positive patients are randomized to either 6 months of CapeOx or FOLFOX or 6 months of FOLFIRINOX.  If we can go ahead and get the [facility] CT DNA cooking outside of clinical trial without significant financial toxicity we may do that.  Ideally chemotherapy would start less than 8 weeks out from surgery and we should have plenty of time to have her heal and to make these decisions collectively.  Patient will need to be off work for at least 6 weeks postoperatively.    Total time of care today inclusive of time spent today prior to her arrival reviewing interval data from Dr. Michel and his operative note and during visit reviewing the preliminary pathology as outlined above for the stage IIIb disease and the standard adjuvant therapy approach  Bernardino Chaparro MD    10/02/2023

## 2024-03-05 ENCOUNTER — OFFICE VISIT (OUTPATIENT)
Dept: PULMONOLOGY | Facility: CLINIC | Age: 49
End: 2024-03-05
Payer: COMMERCIAL

## 2024-03-05 VITALS
BODY MASS INDEX: 35.51 KG/M2 | WEIGHT: 208 LBS | OXYGEN SATURATION: 98 % | HEIGHT: 64 IN | HEART RATE: 82 BPM | SYSTOLIC BLOOD PRESSURE: 126 MMHG | DIASTOLIC BLOOD PRESSURE: 72 MMHG | RESPIRATION RATE: 18 BRPM

## 2024-03-05 DIAGNOSIS — J45.40 MODERATE PERSISTENT ASTHMA WITHOUT COMPLICATION: ICD-10-CM

## 2024-03-05 DIAGNOSIS — E66.9 OBESITY (BMI 30-39.9): ICD-10-CM

## 2024-03-05 DIAGNOSIS — G47.33 OSA (OBSTRUCTIVE SLEEP APNEA): Primary | ICD-10-CM

## 2024-03-05 PROCEDURE — 99214 OFFICE O/P EST MOD 30 MIN: CPT | Performed by: NURSE PRACTITIONER

## 2024-03-05 RX ORDER — DULOXETIN HYDROCHLORIDE 60 MG/1
60 CAPSULE, DELAYED RELEASE ORAL DAILY
COMMUNITY

## 2024-03-05 NOTE — PROGRESS NOTES
"Chief Complaint   Patient presents with    Sleeping Problem    Follow-up         Subjective   Debi Munoz is a 48 y.o. female.   Patient is here today for follow up of asthma and shortness of breath.     Patient says that since the last office visit she has had no recent exacerbations. she denies any emergency room visits or hospitalizations, due to her asthma.     The patient says that she is compliant with pulmonary medicines, as prescribed. She uses Breo daily. She has not used WARNER because she has not been active.     She uses CPAP most nights but does not like it. She was started on Cymbalta and she has noticed she is not sleeping as well. She ran out of supplies so could not use machine for a couple of weeks. She uses FFM.     She is on cymbalta due to fibromyalgia and depression.     She stopped doxepin due to it making her sleepy all day.     She reports a lot of anxiety and she does not get out by herself.       The following portions of the patient's history were reviewed and updated as appropriate: allergies, current medications, past family history, past medical history, past social history, and past surgical history.      Review of Systems   HENT:  Negative for sinus pressure, sneezing and sore throat.    Respiratory:  Positive for cough (some). Negative for chest tightness, shortness of breath and wheezing.    Psychiatric/Behavioral:  Positive for sleep disturbance.        Objective   Visit Vitals  /72   Pulse 82   Resp 18   Ht 162.6 cm (64\") Comment: pt reported   Wt 94.3 kg (208 lb)   LMP 01/12/2021   SpO2 98%   BMI 35.70 kg/m²       Physical Exam  Vitals reviewed.   HENT:      Head: Atraumatic.      Mouth/Throat:      Mouth: Mucous membranes are moist.      Comments: Crowded oropharynx.   Eyes:      Extraocular Movements: Extraocular movements intact.   Cardiovascular:      Rate and Rhythm: Normal rate and regular rhythm.   Pulmonary:      Effort: Pulmonary effort is normal. No " respiratory distress.      Breath sounds: No wheezing.   Abdominal:      Comments: Obese abdomen.    Skin:     General: Skin is warm.   Neurological:      Mental Status: She is alert and oriented to person, place, and time.         Assessment & Plan   Diagnoses and all orders for this visit:    1. ERNA (obstructive sleep apnea) (Primary)    2. Moderate persistent asthma without complication    3. Obesity (BMI 30-39.9)           Return for keep appt in November.    DISCUSSION (if any):  PFT 9/2022 consistent with no obstruction.       Her symptoms of asthma are under adequate control at this time.    Patient's medications for underlying asthma were reviewed with her in great detail.    Any needed adjustments to her pulmonary medications, either for clinical or insurance coverage reasons, have been made and are reflected in the orders.    Side effects of prescribed medications discussed with the patient.    Asthma action plan with discussed with her.    The patient was asked to call this office if the symptoms worsen.     I have encouraged her to take a 5 minute walk twice a day. She has a treadmill so could do this without leaving home.    Sleep study performed in Jan 2017  AHI was 24 / hour.      Latest PAP device provided in Mid 2017  DME company: [employer]     Current PAP settings: 10  Current mask type: FFM    Continue treatment with CPAP at a pressure of 10 with a full facemask.     Patient's compliance data was reviewed and she is not compliant.  I have advised her to try to use the machine at least 5 nights a week.    Humidification setup, hose and mask care discussed.    Weight loss advised.    Use every night for at least 4 hours stressed.       Dictated utilizing Dragon dictation.    This document was electronically signed by JERO Alberto March 5, 2024  12:26 EST

## 2024-06-28 DIAGNOSIS — J45.40 MODERATE PERSISTENT ASTHMA WITHOUT COMPLICATION: ICD-10-CM

## 2024-06-28 RX ORDER — FLUTICASONE FUROATE AND VILANTEROL TRIFENATATE 100; 25 UG/1; UG/1
POWDER RESPIRATORY (INHALATION)
Qty: 60 EACH | Refills: 3 | Status: SHIPPED | OUTPATIENT
Start: 2024-06-28

## 2024-07-09 DIAGNOSIS — J45.40 MODERATE PERSISTENT ASTHMA WITHOUT COMPLICATION: ICD-10-CM

## 2024-07-09 RX ORDER — FLUTICASONE FUROATE AND VILANTEROL TRIFENATATE 100; 25 UG/1; UG/1
1 POWDER RESPIRATORY (INHALATION)
Qty: 60 EACH | Refills: 3 | Status: SHIPPED | OUTPATIENT
Start: 2024-07-09

## 2024-09-26 DIAGNOSIS — J45.40 MODERATE PERSISTENT ASTHMA WITHOUT COMPLICATION: ICD-10-CM

## 2024-09-26 RX ORDER — FLUTICASONE FUROATE AND VILANTEROL TRIFENATATE 100; 25 UG/1; UG/1
POWDER RESPIRATORY (INHALATION)
Qty: 180 EACH | Refills: 3 | Status: SHIPPED | OUTPATIENT
Start: 2024-09-26

## 2024-11-05 ENCOUNTER — OFFICE VISIT (OUTPATIENT)
Dept: PULMONOLOGY | Facility: CLINIC | Age: 49
End: 2024-11-05
Payer: COMMERCIAL

## 2024-11-05 VITALS
BODY MASS INDEX: 29.26 KG/M2 | DIASTOLIC BLOOD PRESSURE: 70 MMHG | SYSTOLIC BLOOD PRESSURE: 124 MMHG | HEIGHT: 64 IN | RESPIRATION RATE: 18 BRPM | WEIGHT: 171.4 LBS | OXYGEN SATURATION: 98 % | HEART RATE: 74 BPM

## 2024-11-05 DIAGNOSIS — J45.30 MILD PERSISTENT ASTHMA WITHOUT COMPLICATION: Primary | ICD-10-CM

## 2024-11-05 DIAGNOSIS — G47.33 OSA (OBSTRUCTIVE SLEEP APNEA): ICD-10-CM

## 2024-11-05 PROCEDURE — 99214 OFFICE O/P EST MOD 30 MIN: CPT | Performed by: INTERNAL MEDICINE

## 2024-11-05 RX ORDER — ALBUTEROL SULFATE 90 UG/1
2 INHALANT RESPIRATORY (INHALATION) EVERY 4 HOURS PRN
Qty: 18 G | Refills: 5 | Status: SHIPPED | OUTPATIENT
Start: 2024-11-05

## 2024-11-05 RX ORDER — ESCITALOPRAM OXALATE 5 MG/1
5 TABLET ORAL DAILY
COMMUNITY

## 2024-11-05 RX ORDER — CARVEDILOL 6.25 MG/1
6.25 TABLET ORAL 2 TIMES DAILY WITH MEALS
COMMUNITY

## 2024-11-05 RX ORDER — ONDANSETRON 4 MG/1
4 TABLET, FILM COATED ORAL EVERY 8 HOURS PRN
COMMUNITY

## 2024-11-05 RX ORDER — LEVOMEFOLATE CALCIUM 7.5 MG
TABLET ORAL DAILY
COMMUNITY

## 2024-11-05 NOTE — PROGRESS NOTES
"  Chief Complaint   Patient presents with    Sleeping Problem    Follow-up       Subjective   Debi Munoz is a 49 y.o. female.   Patient was evaluated today for follow up of asthma, and ERNA.     Patient does not report any recent exacerbations requiring emergency room visits or hospitalizations.     Patient is compliant with pulmonary medicines, as prescribed.     she is currently on Breo. she is using the rescue inhalers minimally.     She has lost considerable amount of weight since September 2023.     Does not feel that CPAP is as helpful. Has gone without it some nights and really can't tell a difference.     The following portions of the patient's history were reviewed and updated as appropriate: allergies, current medications, past family history, past medical history, past social history, and past surgical history.    Review of Systems   HENT:  Negative for sinus pressure, sneezing and sore throat.    Respiratory:  Negative for cough, chest tightness and shortness of breath.    Psychiatric/Behavioral:  Negative for sleep disturbance.        Objective   Visit Vitals  /70   Pulse 74   Resp 18   Ht 162.6 cm (64.02\") Comment: pt reported   Wt 77.7 kg (171 lb 6.4 oz)   LMP 01/12/2021   SpO2 98%   BMI 29.41 kg/m²       BMI Readings from Last 8 Encounters:   11/05/24 29.41 kg/m²   03/05/24 35.70 kg/m²   09/05/23 39.54 kg/m²   06/15/23 40.39 kg/m²   02/01/23 41.18 kg/m²   09/28/22 38.79 kg/m²   06/01/22 40.14 kg/m²   04/15/22 40.83 kg/m²       Physical Exam  Vitals reviewed.   Constitutional:       Appearance: She is well-developed.   HENT:      Head: Atraumatic.   Cardiovascular:      Rate and Rhythm: Normal rate and regular rhythm.      Heart sounds: Normal heart sounds. No murmur heard.  Pulmonary:      Effort: Pulmonary effort is normal. No respiratory distress.      Breath sounds: Normal breath sounds. No wheezing or rales.   Musculoskeletal:      Comments: Gait was normal.   Neurological:      Mental " Status: She is alert and oriented to person, place, and time.           Assessment & Plan   Diagnoses and all orders for this visit:    1. Mild persistent asthma without complication (Primary)    2. ERNA (obstructive sleep apnea)  -     Home Sleep Study; Future    Other orders  -     albuterol sulfate HFA (Ventolin HFA) 108 (90 Base) MCG/ACT inhaler; Inhale 2 puffs Every 4 (Four) Hours As Needed for Wheezing or Shortness of Air.  Dispense: 18 g; Refill: 5           Return in about 6 months (around 5/5/2025) for Recheck, Sleep study, For Mackenzie Mari).    DISCUSSION (if any):  It appears that her symptoms of asthma are under adequate control with the current regimen.    Patient's medications for underlying asthma were reviewed in great detail.    Compliance with medications stressed.     Side effects of prescribed medications discussed with the patient.    The need to continue to be aware of triggers that may cause asthma exacerbation versus progression of disease, was also discussed.    The patient seems to be doing fairly well off the CPAP for now, but given the presence of sleep apnea on her last sleep study, we will need to follow her closely.    Since she seems to be doing well at this time, I recommended that she stay off the CPAP and to watch for recurrence of symptoms clinically.    Since she did have significant sleep apnea on her sleep study from 2017, she will be asked to do home sleep study, if she is able to stay off CPAP for 2-4 weeks and has not had any issues.     Vaccination status addressed.    Up-to-date with influenza vaccinations.     Declines pneumonia vaccinations.     For the vaccines, that she refused today, I have asked her to discuss this further with her PCP.    Dictated utilizing Dragon dictation.    This document was electronically signed by Afshan Smith MD on 11/05/24 at 12:23 EST

## 2024-12-03 ENCOUNTER — HOSPITAL ENCOUNTER (OUTPATIENT)
Dept: SLEEP MEDICINE | Facility: HOSPITAL | Age: 49
Discharge: HOME | End: 2024-12-03
Admitting: INTERNAL MEDICINE
Payer: COMMERCIAL

## 2024-12-03 DIAGNOSIS — G47.33 OSA (OBSTRUCTIVE SLEEP APNEA): ICD-10-CM

## 2024-12-03 PROCEDURE — G0399 HOME SLEEP TEST/TYPE 3 PORTA: HCPCS

## 2025-05-05 ENCOUNTER — OFFICE VISIT (OUTPATIENT)
Dept: PULMONOLOGY | Facility: CLINIC | Age: 50
End: 2025-05-05
Payer: COMMERCIAL

## 2025-05-05 VITALS
HEART RATE: 77 BPM | OXYGEN SATURATION: 99 % | RESPIRATION RATE: 18 BRPM | WEIGHT: 163.2 LBS | HEIGHT: 64 IN | BODY MASS INDEX: 27.86 KG/M2 | SYSTOLIC BLOOD PRESSURE: 126 MMHG | DIASTOLIC BLOOD PRESSURE: 74 MMHG

## 2025-05-05 DIAGNOSIS — J45.30 MILD PERSISTENT ASTHMA WITHOUT COMPLICATION: Primary | ICD-10-CM

## 2025-05-05 DIAGNOSIS — E66.9 OBESITY (BMI 30-39.9): ICD-10-CM

## 2025-05-05 DIAGNOSIS — G47.33 OSA (OBSTRUCTIVE SLEEP APNEA): ICD-10-CM

## 2025-05-05 PROCEDURE — 99213 OFFICE O/P EST LOW 20 MIN: CPT | Performed by: NURSE PRACTITIONER

## 2025-05-05 RX ORDER — HYDROXYZINE PAMOATE 25 MG/1
CAPSULE ORAL
COMMUNITY

## 2025-05-05 RX ORDER — METOPROLOL SUCCINATE 25 MG/1
1 TABLET, EXTENDED RELEASE ORAL DAILY
COMMUNITY
Start: 2025-04-25

## 2025-05-05 NOTE — PROGRESS NOTES
"Chief Complaint   Patient presents with    Sleeping Problem    Follow-up         Subjective   Debi Munoz is a 50 y.o. female.   Patient is here today for follow up of asthma, shortness of breath and ERNA.     Patient says that since the last office visit she has had no recent exacerbations. she denies any emergency room visits or hospitalizations, due to her asthma.     She is not using any inhalers at this time. She has not used the inhalers in the last month. She has not had any issues so far.     I reviewed sleep study results and discussed them with the patient today. The sleep study shows mild ERNA with AHI 13/hour. AHI worse in supine position at 29/hour.    She has used CPAP very little. She continues to wake up on her back.       The following portions of the patient's history were reviewed and updated as appropriate: allergies, current medications, past family history, past medical history, past social history, and past surgical history.    Review of Systems   HENT:  Negative for sinus pressure, sneezing and sore throat.    Respiratory:  Negative for cough, chest tightness, shortness of breath and wheezing.    Psychiatric/Behavioral:  Positive for sleep disturbance.        Objective   Visit Vitals  /74   Pulse 77   Resp 18   Ht 162.6 cm (64.02\")   Wt 74 kg (163 lb 3.2 oz)   LMP 01/12/2021   SpO2 99%   BMI 28.00 kg/m²         Physical Exam  Vitals reviewed.   HENT:      Head: Atraumatic.      Mouth/Throat:      Mouth: Mucous membranes are moist.   Eyes:      Extraocular Movements: Extraocular movements intact.   Cardiovascular:      Rate and Rhythm: Normal rate and regular rhythm.   Pulmonary:      Effort: Pulmonary effort is normal. No respiratory distress.      Breath sounds: No wheezing.   Skin:     General: Skin is warm.   Neurological:      Mental Status: She is alert and oriented to person, place, and time.           Assessment & Plan   Diagnoses and all orders for this visit:    1. Mild " persistent asthma without complication (Primary)    2. ERNA (obstructive sleep apnea)    3. Obesity (BMI 30-39.9)           Return in about 8 months (around 1/5/2026) for Recheck, For Dr. Smith.    DISCUSSION (if any):  PFT 9/2022 consistent with no obstruction.    At this time, her asthma symptoms are stable.  I have told the patient if she has worsening symptoms in the next few months to resume Breo.     Home sleep study 12/2024   AHI 13/hour  Supine AHI 29/hour    She continues to wake up on her back. Her sleep study continues to show mild sleep apnea, worse in supine position despite considerable weight loss. I have recommended she resume CPAP machine nightly.     She states she will resume her CPAP machine nightly.  She uses a fullface mask.  She does not need supplies now.    She has lost about 75 pounds.    Dictated utilizing Dragon dictation.    This document was electronically signed by JERO Alberto May 5, 2025  14:11 EDT

## (undated) DEVICE — RICH LAVH: Brand: MEDLINE INDUSTRIES, INC.

## (undated) DEVICE — SOL IRR NACL 0.9PCT BT 1000ML

## (undated) DEVICE — PROB ABL ENDOMTRL NOVASURE/G3 IMPEDENCE 1P/U

## (undated) DEVICE — SUT GUT CHRM 2/0 SH 27IN G123H

## (undated) DEVICE — SUT GUT CHRM 0 802H

## (undated) DEVICE — ENDOPATH XCEL UNIVERSAL TROCAR STABLILITY SLEEVES: Brand: ENDOPATH XCEL

## (undated) DEVICE — GLV SURG BIOGEL M LTX PF 6 1/2

## (undated) DEVICE — SOL NACL 0.9PCT 1000ML

## (undated) DEVICE — APPL HEMOS FOR DELIVERY FLOSEAL

## (undated) DEVICE — RICH MINOR LITHOTOMY: Brand: MEDLINE INDUSTRIES, INC.

## (undated) DEVICE — SLV SCD CALF HEMOFORCE DVT THERP REPROC MD

## (undated) DEVICE — SUT VIC 0 CT2 CR8 18IN DYED J727D

## (undated) DEVICE — ENDOPATH XCEL BLADELESS TROCARS WITH STABILITY SLEEVES: Brand: ENDOPATH XCEL

## (undated) DEVICE — SPNG GZ WOVN 4X4IN 12PLY 10/BX STRL

## (undated) DEVICE — SUT ETHLN 4/0 PS2 PLSTC 1667G

## (undated) DEVICE — PAD STEEP TRENDELENBURG W/RAIL STRAP INTEGR ARM PROTECT WING

## (undated) DEVICE — 2, DISPOSABLE SUCTION/IRRIGATOR WITHOUT DISPOSABLE TIP: Brand: STRYKEFLOW

## (undated) DEVICE — PENCL ES MEGADINE EZ/CLEAN BUTN W/HOLSTR 10FT

## (undated) DEVICE — ST IRR CYSTO W/SPK 77IN LF

## (undated) DEVICE — TOTAL TRAY, 16FR 10ML SIL FOLEY, URN: Brand: MEDLINE

## (undated) DEVICE — MARKR SKIN W/RULR

## (undated) DEVICE — PATIENT RETURN ELECTRODE, SINGLE-USE, CONTACT QUALITY MONITORING, ADULT, WITH 9FT CORD, FOR PATIENTS WEIGING OVER 33LBS. (15KG): Brand: MEGADYNE

## (undated) DEVICE — BLUNT TIP LAPAROSCOPIC SEALER/DIVIDER NANO-COATED: Brand: LIGASURE

## (undated) DEVICE — PREMIUM WET SKIN PREP TRAY CHG: Brand: MEDLINE INDUSTRIES, INC.

## (undated) DEVICE — SUT GUT CHRM 1 SUTUPAK TIES 18IN SG15T